# Patient Record
Sex: MALE | Race: WHITE
[De-identification: names, ages, dates, MRNs, and addresses within clinical notes are randomized per-mention and may not be internally consistent; named-entity substitution may affect disease eponyms.]

---

## 2017-03-28 ENCOUNTER — APPOINTMENT (OUTPATIENT)
Dept: PODIATRY | Facility: CLINIC | Age: 62
End: 2017-03-28

## 2017-03-28 VITALS — WEIGHT: 175 LBS | BODY MASS INDEX: 34.36 KG/M2 | HEIGHT: 60 IN

## 2017-03-28 DIAGNOSIS — L85.3 XEROSIS CUTIS: ICD-10-CM

## 2017-04-25 ENCOUNTER — OUTPATIENT (OUTPATIENT)
Dept: OUTPATIENT SERVICES | Facility: HOSPITAL | Age: 62
LOS: 1 days | Discharge: HOME | End: 2017-04-25

## 2017-04-25 ENCOUNTER — APPOINTMENT (OUTPATIENT)
Dept: PODIATRY | Facility: CLINIC | Age: 62
End: 2017-04-25

## 2017-04-25 VITALS
WEIGHT: 176 LBS | SYSTOLIC BLOOD PRESSURE: 112 MMHG | HEIGHT: 61 IN | BODY MASS INDEX: 33.23 KG/M2 | DIASTOLIC BLOOD PRESSURE: 88 MMHG | HEART RATE: 80 BPM

## 2017-04-25 LAB
ANION GAP SERPL CALC-SCNC: 5 MEQ/L
BUN SERPL-MCNC: 9 MG/DL
BUN/CREAT SERPL: 10.2 %
CALCIUM SERPL-MCNC: 9.3 MG/DL
CHLORIDE SERPL-SCNC: 104 MEQ/L
CO2 SERPL-SCNC: 29 MEQ/L
CREAT SERPL-MCNC: 0.88 MG/DL
GFR SERPL CREATININE-BSD FRML MDRD: 88
GLUCOSE SERPL-MCNC: 110 MG/DL
POTASSIUM SERPL-SCNC: 4.7 MMOL/L
SODIUM SERPL-SCNC: 138 MEQ/L

## 2017-05-19 ENCOUNTER — APPOINTMENT (OUTPATIENT)
Dept: GASTROENTEROLOGY | Facility: CLINIC | Age: 62
End: 2017-05-19

## 2017-06-26 ENCOUNTER — OUTPATIENT (OUTPATIENT)
Dept: OUTPATIENT SERVICES | Facility: HOSPITAL | Age: 62
LOS: 1 days | Discharge: HOME | End: 2017-06-26

## 2017-06-26 ENCOUNTER — OTHER (OUTPATIENT)
Age: 62
End: 2017-06-26

## 2017-06-28 DIAGNOSIS — M79.672 PAIN IN LEFT FOOT: ICD-10-CM

## 2017-06-28 DIAGNOSIS — M25.522 PAIN IN LEFT ELBOW: ICD-10-CM

## 2017-06-28 DIAGNOSIS — M72.2 PLANTAR FASCIAL FIBROMATOSIS: ICD-10-CM

## 2017-07-24 ENCOUNTER — OUTPATIENT (OUTPATIENT)
Dept: OUTPATIENT SERVICES | Facility: HOSPITAL | Age: 62
LOS: 1 days | Discharge: HOME | End: 2017-07-24

## 2017-07-25 DIAGNOSIS — H52.03 HYPERMETROPIA, BILATERAL: ICD-10-CM

## 2017-07-25 DIAGNOSIS — H52.222 REGULAR ASTIGMATISM, LEFT EYE: ICD-10-CM

## 2017-07-25 DIAGNOSIS — H52.4 PRESBYOPIA: ICD-10-CM

## 2017-07-25 DIAGNOSIS — H11.002 UNSPECIFIED PTERYGIUM OF LEFT EYE: ICD-10-CM

## 2017-07-27 ENCOUNTER — OUTPATIENT (OUTPATIENT)
Dept: OUTPATIENT SERVICES | Facility: HOSPITAL | Age: 62
LOS: 1 days | Discharge: HOME | End: 2017-07-27

## 2017-07-27 DIAGNOSIS — Z02.9 ENCOUNTER FOR ADMINISTRATIVE EXAMINATIONS, UNSPECIFIED: ICD-10-CM

## 2017-07-28 ENCOUNTER — OUTPATIENT (OUTPATIENT)
Dept: OUTPATIENT SERVICES | Facility: HOSPITAL | Age: 62
LOS: 1 days | Discharge: HOME | End: 2017-07-28

## 2017-07-28 DIAGNOSIS — M72.2 PLANTAR FASCIAL FIBROMATOSIS: ICD-10-CM

## 2017-08-02 ENCOUNTER — OUTPATIENT (OUTPATIENT)
Dept: OUTPATIENT SERVICES | Facility: HOSPITAL | Age: 62
LOS: 1 days | Discharge: HOME | End: 2017-08-02

## 2017-08-03 DIAGNOSIS — M15.0 PRIMARY GENERALIZED (OSTEO)ARTHRITIS: ICD-10-CM

## 2017-08-03 DIAGNOSIS — M77.10 LATERAL EPICONDYLITIS, UNSPECIFIED ELBOW: ICD-10-CM

## 2017-08-15 ENCOUNTER — APPOINTMENT (OUTPATIENT)
Dept: PODIATRY | Facility: CLINIC | Age: 62
End: 2017-08-15

## 2017-08-15 ENCOUNTER — OUTPATIENT (OUTPATIENT)
Dept: OUTPATIENT SERVICES | Facility: HOSPITAL | Age: 62
LOS: 1 days | Discharge: HOME | End: 2017-08-15

## 2017-08-15 VITALS — SYSTOLIC BLOOD PRESSURE: 136 MMHG | HEART RATE: 69 BPM | DIASTOLIC BLOOD PRESSURE: 65 MMHG

## 2017-08-15 VITALS — WEIGHT: 176 LBS | HEIGHT: 61 IN | BODY MASS INDEX: 33.23 KG/M2

## 2017-09-08 ENCOUNTER — APPOINTMENT (OUTPATIENT)
Dept: GASTROENTEROLOGY | Facility: CLINIC | Age: 62
End: 2017-09-08

## 2017-10-13 ENCOUNTER — OUTPATIENT (OUTPATIENT)
Dept: OUTPATIENT SERVICES | Facility: HOSPITAL | Age: 62
LOS: 1 days | Discharge: HOME | End: 2017-10-13

## 2017-10-20 ENCOUNTER — OUTPATIENT (OUTPATIENT)
Dept: OUTPATIENT SERVICES | Facility: HOSPITAL | Age: 62
LOS: 1 days | Discharge: HOME | End: 2017-10-20

## 2017-11-06 ENCOUNTER — OUTPATIENT (OUTPATIENT)
Dept: OUTPATIENT SERVICES | Facility: HOSPITAL | Age: 62
LOS: 1 days | Discharge: HOME | End: 2017-11-06

## 2017-12-06 ENCOUNTER — OUTPATIENT (OUTPATIENT)
Dept: OUTPATIENT SERVICES | Facility: HOSPITAL | Age: 62
LOS: 1 days | Discharge: HOME | End: 2017-12-06

## 2017-12-06 DIAGNOSIS — K02.52 DENTAL CARIES ON PIT AND FISSURE SURFACE PENETRATING INTO DENTIN: ICD-10-CM

## 2017-12-11 ENCOUNTER — OUTPATIENT (OUTPATIENT)
Dept: OUTPATIENT SERVICES | Facility: HOSPITAL | Age: 62
LOS: 1 days | Discharge: HOME | End: 2017-12-11

## 2017-12-15 DIAGNOSIS — K05.4 PERIODONTOSIS: ICD-10-CM

## 2017-12-26 ENCOUNTER — OUTPATIENT (OUTPATIENT)
Dept: OUTPATIENT SERVICES | Facility: HOSPITAL | Age: 62
LOS: 1 days | Discharge: HOME | End: 2017-12-26

## 2017-12-27 DIAGNOSIS — K02.53 DENTAL CARIES ON PIT AND FISSURE SURFACE PENETRATING INTO PULP: ICD-10-CM

## 2018-01-29 ENCOUNTER — OUTPATIENT (OUTPATIENT)
Dept: OUTPATIENT SERVICES | Facility: HOSPITAL | Age: 63
LOS: 1 days | Discharge: HOME | End: 2018-01-29

## 2018-04-26 ENCOUNTER — OUTPATIENT (OUTPATIENT)
Dept: OUTPATIENT SERVICES | Facility: HOSPITAL | Age: 63
LOS: 1 days | Discharge: HOME | End: 2018-04-26

## 2018-04-27 DIAGNOSIS — K02.53 DENTAL CARIES ON PIT AND FISSURE SURFACE PENETRATING INTO PULP: ICD-10-CM

## 2018-05-24 ENCOUNTER — OUTPATIENT (OUTPATIENT)
Dept: OUTPATIENT SERVICES | Facility: HOSPITAL | Age: 63
LOS: 1 days | Discharge: HOME | End: 2018-05-24

## 2018-06-12 ENCOUNTER — OUTPATIENT (OUTPATIENT)
Dept: OUTPATIENT SERVICES | Facility: HOSPITAL | Age: 63
LOS: 1 days | Discharge: HOME | End: 2018-06-12

## 2018-06-12 DIAGNOSIS — K02.53 DENTAL CARIES ON PIT AND FISSURE SURFACE PENETRATING INTO PULP: ICD-10-CM

## 2019-03-22 ENCOUNTER — APPOINTMENT (OUTPATIENT)
Dept: GASTROENTEROLOGY | Facility: CLINIC | Age: 64
End: 2019-03-22

## 2019-03-22 ENCOUNTER — OUTPATIENT (OUTPATIENT)
Dept: OUTPATIENT SERVICES | Facility: HOSPITAL | Age: 64
LOS: 1 days | Discharge: HOME | End: 2019-03-22

## 2019-03-22 NOTE — REVIEW OF SYSTEMS
[Fever] : no fever [Eye Pain] : no eye pain [Earache] : no earache [Chest Pain] : no chest pain [Shortness Of Breath] : no shortness of breath [Abdominal Pain] : no abdominal pain [Constipation] : no constipation [Diarrhea] : no diarrhea [Dysuria] : no dysuria [Confused] : no confusion [Suicidal] : not suicidal

## 2019-03-22 NOTE — PHYSICAL EXAM
[General Appearance - Alert] : alert [General Appearance - In No Acute Distress] : in no acute distress [Sclera] : the sclera and conjunctiva were normal [Outer Ear] : the ears and nose were normal in appearance [Neck Appearance] : the appearance of the neck was normal [] : no respiratory distress [Bowel Sounds] : normal bowel sounds [Abdomen Soft] : soft [No CVA Tenderness] : no ~M costovertebral angle tenderness [Abnormal Walk] : normal gait [Cranial Nerves] : cranial nerves 2-12 were intact [Deep Tendon Reflexes (DTR)] : deep tendon reflexes were 2+ and symmetric [Oriented To Time, Place, And Person] : oriented to person, place, and time

## 2019-03-22 NOTE — ASSESSMENT
[FreeTextEntry1] : 62 yo with obesity p/for screening colonoscopy. He denies any symptoms except for intermittent hard stools. \par \par 1- HCM\par screening colonoscopy \par ASA 1 \par \par 2- Obesity \par weight loss and healthy diet advised \par \par 3- hepatic steatosis on ct abd 2016\par check HBA1c, lipid profile \par weight loss and exercise recommended \par check basic labs \par

## 2019-03-22 NOTE — HISTORY OF PRESENT ILLNESS
[FreeTextEntry1] : 64 yo with obesity p/for screening colonoscopy. He denies any symptoms except for intermttent hard stools. \par \[facility] \par no [facility] \par FH negative \par has 2 bms per day, normal, no blood, nl appetite

## 2019-04-16 LAB
ALBUMIN SERPL ELPH-MCNC: 4.7 G/DL
ALP BLD-CCNC: 122 U/L
ALT SERPL-CCNC: 27 U/L
ANION GAP SERPL CALC-SCNC: 12 MMOL/L
AST SERPL-CCNC: 22 U/L
BASOPHILS # BLD AUTO: 0.03 K/UL
BASOPHILS NFR BLD AUTO: 0.4 %
BILIRUB SERPL-MCNC: 0.4 MG/DL
BUN SERPL-MCNC: 16 MG/DL
CALCIUM SERPL-MCNC: 9.2 MG/DL
CHLORIDE SERPL-SCNC: 102 MMOL/L
CO2 SERPL-SCNC: 25 MMOL/L
CREAT SERPL-MCNC: 0.9 MG/DL
EOSINOPHIL # BLD AUTO: 0.08 K/UL
EOSINOPHIL NFR BLD AUTO: 1.1 %
ESTIMATED AVERAGE GLUCOSE: 131 MG/DL
GLUCOSE SERPL-MCNC: 87 MG/DL
HBA1C MFR BLD HPLC: 6.2 %
HCT VFR BLD CALC: 45.3 %
HGB BLD-MCNC: 14.8 G/DL
IMM GRANULOCYTES NFR BLD AUTO: 0.7 %
INR PPP: 1.04 RATIO
LYMPHOCYTES # BLD AUTO: 2.22 K/UL
LYMPHOCYTES NFR BLD AUTO: 29.6 %
MAN DIFF?: NORMAL
MCHC RBC-ENTMCNC: 28.7 PG
MCHC RBC-ENTMCNC: 32.7 G/DL
MCV RBC AUTO: 87.8 FL
MONOCYTES # BLD AUTO: 0.61 K/UL
MONOCYTES NFR BLD AUTO: 8.1 %
NEUTROPHILS # BLD AUTO: 4.52 K/UL
NEUTROPHILS NFR BLD AUTO: 60.1 %
PLATELET # BLD AUTO: 236 K/UL
POTASSIUM SERPL-SCNC: 4.5 MMOL/L
PROT SERPL-MCNC: 7.6 G/DL
PT BLD: 12 SEC
RBC # BLD: 5.16 M/UL
RBC # FLD: 12.7 %
SODIUM SERPL-SCNC: 139 MMOL/L
WBC # FLD AUTO: 7.51 K/UL

## 2019-04-29 ENCOUNTER — EMERGENCY (EMERGENCY)
Facility: HOSPITAL | Age: 64
LOS: 0 days | Discharge: HOME | End: 2019-04-29
Admitting: EMERGENCY MEDICINE
Payer: MEDICAID

## 2019-04-29 VITALS
OXYGEN SATURATION: 98 % | TEMPERATURE: 98 F | SYSTOLIC BLOOD PRESSURE: 138 MMHG | DIASTOLIC BLOOD PRESSURE: 74 MMHG | RESPIRATION RATE: 18 BRPM | HEART RATE: 84 BPM | WEIGHT: 210.1 LBS

## 2019-04-29 DIAGNOSIS — M25.562 PAIN IN LEFT KNEE: ICD-10-CM

## 2019-04-29 DIAGNOSIS — Z79.899 OTHER LONG TERM (CURRENT) DRUG THERAPY: ICD-10-CM

## 2019-04-29 DIAGNOSIS — R05 COUGH: ICD-10-CM

## 2019-04-29 DIAGNOSIS — E11.9 TYPE 2 DIABETES MELLITUS WITHOUT COMPLICATIONS: ICD-10-CM

## 2019-04-29 PROCEDURE — 73562 X-RAY EXAM OF KNEE 3: CPT | Mod: 26,LT

## 2019-04-29 PROCEDURE — 99283 EMERGENCY DEPT VISIT LOW MDM: CPT

## 2019-04-29 RX ORDER — ACETAMINOPHEN 500 MG
650 TABLET ORAL ONCE
Qty: 0 | Refills: 0 | Status: COMPLETED | OUTPATIENT
Start: 2019-04-29 | End: 2019-04-29

## 2019-04-29 RX ORDER — AZITHROMYCIN 500 MG/1
1 TABLET, FILM COATED ORAL
Qty: 3 | Refills: 0
Start: 2019-04-29 | End: 2019-05-01

## 2019-04-29 RX ADMIN — Medication 650 MILLIGRAM(S): at 22:36

## 2019-04-29 NOTE — ED PROVIDER NOTE - NS ED ROS FT
Constitutional: no fever, chills, no recent weight loss, change in appetite or malaise  Cardiac: No chest pain, SOB or edema.  Respiratory: No cough or respiratory distress  GI: No nausea, vomiting, diarrhea or abdominal pain.  : No dysuria, frequency, urgency or hematuria  MS: + L knee pain. No decreased rom. No weakness  Neuro: No headache or weakness. No LOC. No decreased sensation, paresthesias  Skin: No skin rash.  Except as documented in the HPI, all other systems are negative. Constitutional: no fever, chills, no recent weight loss, change in appetite or malaise  ENT: + rhinorrhea. No sore throat/ear pain  Cardiac: No chest pain, SOB or edema.  Respiratory: + non productive cough. No respiratory distress  GI: No nausea, vomiting, diarrhea or abdominal pain.  : No dysuria, frequency, urgency or hematuria  MS: + L knee pain. No decreased rom. No weakness  Neuro: No headache or weakness. No LOC. No decreased sensation, paresthesias  Skin: No skin rash.  Except as documented in the HPI, all other systems are negative.

## 2019-04-29 NOTE — ED PROVIDER NOTE - OBJECTIVE STATEMENT
63 year old M with hx of DM c/o L anterior knee pain x 2 months. The pain is described as "soreness" and is worse with walking and better with rest. He has been taking anti inflammatories without relief. He denies any trauma/injuries, knee swelling, redness, calf pain, swelling, smoking hx, fever/chills. 63 year old M with hx of DM c/o L anterior knee pain x 2 months. The pain is described as "soreness" and is worse with walking and better with rest. He has been taking anti inflammatories without relief. Pt also c/o of rhinorrhea and non productive cough x 5 days. He denies any trauma/injuries, knee swelling, redness, calf pain, swelling, fever/chills, sore throat, smoking hx, hx of pneumonia, chest pain, sob.

## 2019-04-29 NOTE — ED PROVIDER NOTE - PHYSICAL EXAMINATION
CONSTITUTIONAL: Well-appearing; well-nourished; in no apparent distress.   CARDIOVASCULAR: Normal S1, S2; no murmurs, rubs, or gallops.   RESPIRATORY: Normal chest excursion with respiration; breath sounds clear and equal bilaterally; no wheezes, rhonchi, or rales.  GI/: Normal bowel sounds; non-distended; non-tender; no palpable organomegaly.   MS: No evidence of trauma or deformity. No swelling/effusion noted. + mild ttp over L medial patella. Full rom of b/l knee. Pedal pulses intact. No calf ttp. Normal gait. Strength equal b/l 5/5 throughout  SKIN: Normal for age and race; warm; dry; good turgor; no apparent lesions or exudate.   NEURO/PSYCH: A & O x 4; grossly unremarkable. mood and manner are appropriate. Grooming and personal hygiene are appropriate. CONSTITUTIONAL: Well-appearing; well-nourished; in no apparent distress.   ENT: non erythematous pharynx. No exudates  CARDIOVASCULAR: Normal S1, S2; no murmurs, rubs, or gallops.   RESPIRATORY: Normal chest excursion with respiration; breath sounds clear and equal bilaterally; no wheezes, rhonchi, or rales.  GI/: Normal bowel sounds; non-distended; non-tender; no palpable organomegaly.   MS: No evidence of trauma or deformity. No swelling/effusion noted. + mild ttp over L medial patella. Full rom of b/l knee. Pedal pulses intact. No calf ttp. Normal gait. Strength equal b/l 5/5 throughout  SKIN: Normal for age and race; warm; dry; good turgor; no apparent lesions or exudate.   NEURO/PSYCH: A & O x 4; grossly unremarkable. mood and manner are appropriate. Grooming and personal hygiene are appropriate.

## 2019-04-29 NOTE — ED PROVIDER NOTE - CARE PROVIDER_API CALL
Shaggy Peoples (MD)  Orthopaedic Surgery  [address] [place] [address]  Phone: [phone]  Fax: [phone]  Follow Up Time:

## 2019-05-01 ENCOUNTER — OUTPATIENT (OUTPATIENT)
Dept: OUTPATIENT SERVICES | Facility: HOSPITAL | Age: 64
LOS: 1 days | Discharge: HOME | End: 2019-05-01

## 2019-05-01 ENCOUNTER — APPOINTMENT (OUTPATIENT)
Dept: ORTHOPEDIC SURGERY | Facility: CLINIC | Age: 64
End: 2019-05-01

## 2019-05-01 PROBLEM — Z78.9 OTHER SPECIFIED HEALTH STATUS: Chronic | Status: ACTIVE | Noted: 2019-04-29

## 2019-05-07 ENCOUNTER — APPOINTMENT (OUTPATIENT)
Dept: DERMATOLOGY | Facility: CLINIC | Age: 64
End: 2019-05-07

## 2019-05-07 ENCOUNTER — OUTPATIENT (OUTPATIENT)
Dept: OUTPATIENT SERVICES | Facility: HOSPITAL | Age: 64
LOS: 1 days | Discharge: HOME | End: 2019-05-07

## 2019-05-07 NOTE — PHYSICAL EXAM
[FreeTextEntry3] : Skin tag noted on left shoulder, round in shape but about 0.5 cm in length. \par Small skin tags noted on bilateral inner thighs near the groin

## 2019-05-07 NOTE — ASSESSMENT
[FreeTextEntry1] : Patient is a 65 yo M with no significant PMHx presenting with complaints of facial moles and multiple areas of skin tags\par \par Plan:\par Facial moles benign in appearance. Can continue to observe. Skin tag on the left shoulder treated with liquid nitrogen today in office. Patient can follow up in 2 months.

## 2019-05-07 NOTE — HISTORY OF PRESENT ILLNESS
[de-identified] : Patient is a 63 yo M with no significant PMHx presenting with complaints of facial moles and multiple areas of skin tags. Patient says he has noticed the moles on his face over the last few months but initially they were raised in appearance but have become more flat. He is requesting to have his skin tags removed (if possible) for cosmetic purposes.

## 2019-05-07 NOTE — END OF VISIT
[FreeTextEntry3] : \par I performed cryo of largest tag, he will return for treatment of smaller ones [] : Resident

## 2019-06-13 ENCOUNTER — RESULT REVIEW (OUTPATIENT)
Age: 64
End: 2019-06-13

## 2019-06-13 ENCOUNTER — TRANSCRIPTION ENCOUNTER (OUTPATIENT)
Age: 64
End: 2019-06-13

## 2019-06-13 ENCOUNTER — OUTPATIENT (OUTPATIENT)
Dept: OUTPATIENT SERVICES | Facility: HOSPITAL | Age: 64
LOS: 1 days | Discharge: HOME | End: 2019-06-13
Payer: SUBSIDIZED

## 2019-06-13 VITALS
HEART RATE: 67 BPM | TEMPERATURE: 98 F | SYSTOLIC BLOOD PRESSURE: 153 MMHG | HEIGHT: 61 IN | WEIGHT: 169.98 LBS | DIASTOLIC BLOOD PRESSURE: 98 MMHG | RESPIRATION RATE: 16 BRPM

## 2019-06-13 VITALS — RESPIRATION RATE: 18 BRPM | DIASTOLIC BLOOD PRESSURE: 92 MMHG | SYSTOLIC BLOOD PRESSURE: 132 MMHG | HEART RATE: 60 BPM

## 2019-06-13 DIAGNOSIS — Z98.890 OTHER SPECIFIED POSTPROCEDURAL STATES: Chronic | ICD-10-CM

## 2019-06-13 PROCEDURE — 45380 COLONOSCOPY AND BIOPSY: CPT

## 2019-06-13 PROCEDURE — 88305 TISSUE EXAM BY PATHOLOGIST: CPT | Mod: 26

## 2019-06-13 NOTE — H&P PST ADULT - ASSESSMENT
63 yo M with PMH mentioned here for screening colonoscopy   Risks and benefits discussed with the patient.   Will proceed with the scheduled case.

## 2019-06-13 NOTE — ASU DISCHARGE PLAN (ADULT/PEDIATRIC) - CALL YOUR DOCTOR IF YOU HAVE ANY OF THE FOLLOWING:
Inability to tolerate liquids or foods/Pain not relieved by Medications/Bleeding that does not stop/Nausea and vomiting that does not stop

## 2019-06-13 NOTE — PRE-ANESTHESIA EVALUATION PEDIATRIC - NSATTENDATTESTRD_GEN_ALL_CORE
(3) assistive equipment and person
The patient has been re-examined and I agree with the above assessment or I updated with my findings.

## 2019-06-13 NOTE — CHART NOTE - NSCHARTNOTEFT_GEN_A_CORE
PACU ANESTHESIA ADMISSION NOTE      Procedure: colonoscopy  Post op diagnosis:  screening, colon polyps    ____  Intubated  TV:______       Rate: ______      FiO2: ______    _x___  Patent Airway    _x___  Full return of protective reflexes    _x___  Full recovery from anesthesia / back to baseline     Vitals:   T:  37C         R:      12            BP:     109/58             Sat:    97               P: 66      Mental Status:  __x__ Awake   __x___ Alert   _____ Drowsy   _____ Sedated    Nausea/Vomiting:  _x___ NO  ______Yes,   See Post - Op Orders          Pain Scale (0-10):  __0/10___    Treatment: ____ None    __x__ See Post - Op/PCA Orders    Post - Operative Fluids:   ____ Oral   _x___ See Post - Op Orders    Plan: Discharge:   __x__Home       _____Floor     _____Critical Care    _____  Other:_________________    Comments: pt tolerated procedure well, no anesthesia related complications. Care of pt endorsed to PACU, report given to PACU RN.

## 2019-06-18 LAB — SURGICAL PATHOLOGY STUDY: SIGNIFICANT CHANGE UP

## 2019-06-21 DIAGNOSIS — K64.8 OTHER HEMORRHOIDS: ICD-10-CM

## 2019-06-21 DIAGNOSIS — D12.0 BENIGN NEOPLASM OF CECUM: ICD-10-CM

## 2019-06-21 DIAGNOSIS — K57.30 DIVERTICULOSIS OF LARGE INTESTINE WITHOUT PERFORATION OR ABSCESS WITHOUT BLEEDING: ICD-10-CM

## 2019-06-21 DIAGNOSIS — K52.9 NONINFECTIVE GASTROENTERITIS AND COLITIS, UNSPECIFIED: ICD-10-CM

## 2019-06-21 DIAGNOSIS — D12.4 BENIGN NEOPLASM OF DESCENDING COLON: ICD-10-CM

## 2019-06-21 DIAGNOSIS — Z12.11 ENCOUNTER FOR SCREENING FOR MALIGNANT NEOPLASM OF COLON: ICD-10-CM

## 2019-06-28 ENCOUNTER — LABORATORY RESULT (OUTPATIENT)
Age: 64
End: 2019-06-28

## 2019-06-28 ENCOUNTER — OUTPATIENT (OUTPATIENT)
Dept: OUTPATIENT SERVICES | Facility: HOSPITAL | Age: 64
LOS: 1 days | Discharge: HOME | End: 2019-06-28

## 2019-06-28 ENCOUNTER — APPOINTMENT (OUTPATIENT)
Dept: GASTROENTEROLOGY | Facility: CLINIC | Age: 64
End: 2019-06-28
Payer: SUBSIDIZED

## 2019-06-28 DIAGNOSIS — Z98.890 OTHER SPECIFIED POSTPROCEDURAL STATES: Chronic | ICD-10-CM

## 2019-06-28 PROCEDURE — 99214 OFFICE O/P EST MOD 30 MIN: CPT

## 2019-06-28 NOTE — REASON FOR VISIT
[Follow-Up: _____] : a [unfilled] follow-up visit [Initial Evaluation] : an initial evaluation [FreeTextEntry1] : colonoscopy for screening

## 2019-06-28 NOTE — ASSESSMENT
[FreeTextEntry1] : 62 yo with obesity p/for screening colonoscopy. He denies any symptoms except for intermittent hard stools. \par \par 1- colon polyp\par colonoscopy 6/13/19 dr colvin good prep, 3 mm cecal polyp (mucosa), 5 mm DC TA, mild sigmoid diverticulosis, internal hemorrhoids, SC erythema (mild inflammation on bx) \par colonoscopy in 5 years \par \par 2- Obesity \par weight loss and healthy diet advised \par \par 3- hepatic steatosis on ct abd 2016\par HBA1c 6.2\par check lipid profile \par weight loss and exercise recommended \par  \par 4- Born in [birth year]\par check HCV ab

## 2019-06-28 NOTE — HISTORY OF PRESENT ILLNESS
[FreeTextEntry1] : 62 yo with obesity p/for fu for results of screening colonoscopy. He denies any symptoms except for intermittent hard stools. \par \[facility] \par colonoscopy 6/13/19 dr colvin good prep, 3 mm cecal polyp (mucosa), 5 mm DC TA, mild sigmoid diverticulosis, internal hemorrhoids, SC erythema (mild inflammation on bx) \par FH negative \par has 2 bms per day, normal, no blood, nl appetite

## 2019-06-28 NOTE — PHYSICAL EXAM
[General Appearance - Alert] : alert [Sclera] : the sclera and conjunctiva were normal [General Appearance - In No Acute Distress] : in no acute distress [Neck Appearance] : the appearance of the neck was normal [Outer Ear] : the ears and nose were normal in appearance [Bowel Sounds] : normal bowel sounds [Abdomen Soft] : soft [] : no respiratory distress [No CVA Tenderness] : no ~M costovertebral angle tenderness [Cranial Nerves] : cranial nerves 2-12 were intact [Abnormal Walk] : normal gait [Deep Tendon Reflexes (DTR)] : deep tendon reflexes were 2+ and symmetric [Oriented To Time, Place, And Person] : oriented to person, place, and time [Heart Sounds] : normal S1 and S2 [Heart Rate And Rhythm] : heart rate was normal and rhythm regular [Affect] : the affect was normal

## 2019-06-28 NOTE — REVIEW OF SYSTEMS
[Eye Pain] : no eye pain [Earache] : no earache [Fever] : no fever [Abdominal Pain] : no abdominal pain [Chest Pain] : no chest pain [Shortness Of Breath] : no shortness of breath [Diarrhea] : no diarrhea [Constipation] : no constipation [Dysuria] : no dysuria [Suicidal] : not suicidal [Confused] : no confusion

## 2019-07-01 LAB
HCV AB SER QL: NONREACTIVE
HCV S/CO RATIO: 0.04 S/CO

## 2019-07-09 ENCOUNTER — APPOINTMENT (OUTPATIENT)
Dept: DERMATOLOGY | Facility: CLINIC | Age: 64
End: 2019-07-09
Payer: SELF-PAY

## 2019-07-09 ENCOUNTER — OUTPATIENT (OUTPATIENT)
Dept: OUTPATIENT SERVICES | Facility: HOSPITAL | Age: 64
LOS: 1 days | Discharge: HOME | End: 2019-07-09

## 2019-07-09 DIAGNOSIS — Z98.890 OTHER SPECIFIED POSTPROCEDURAL STATES: Chronic | ICD-10-CM

## 2019-07-09 DIAGNOSIS — L91.8 OTHER HYPERTROPHIC DISORDERS OF THE SKIN: ICD-10-CM

## 2019-07-09 PROCEDURE — 11200 RMVL SKIN TAGS UP TO&INC 15: CPT

## 2019-07-09 PROCEDURE — 11201 RMVL SKIN TAGS EA ADDL 10: CPT

## 2019-07-10 ENCOUNTER — OUTPATIENT (OUTPATIENT)
Dept: OUTPATIENT SERVICES | Facility: HOSPITAL | Age: 64
LOS: 1 days | Discharge: HOME | End: 2019-07-10

## 2019-07-10 DIAGNOSIS — Z98.890 OTHER SPECIFIED POSTPROCEDURAL STATES: Chronic | ICD-10-CM

## 2019-07-18 DIAGNOSIS — M15.0 PRIMARY GENERALIZED (OSTEO)ARTHRITIS: ICD-10-CM

## 2019-08-13 ENCOUNTER — APPOINTMENT (OUTPATIENT)
Dept: PLASTIC SURGERY | Facility: CLINIC | Age: 64
End: 2019-08-13
Payer: SUBSIDIZED

## 2019-08-13 VITALS — HEIGHT: 61 IN | BODY MASS INDEX: 33.23 KG/M2 | WEIGHT: 176 LBS

## 2019-08-13 PROCEDURE — 99202 OFFICE O/P NEW SF 15 MIN: CPT

## 2019-08-13 NOTE — PHYSICAL EXAM
[de-identified] : well-developed male, NAD [de-identified] : NC/AT [de-identified] : PERRL [de-identified] : multiple bilateral neck skin tags, nontender, nonirritated, nonpigmented  [de-identified] : good inspiratory effort [de-identified] : ANITAR [de-identified] : soft, nontender

## 2019-08-13 NOTE — ASSESSMENT
[FreeTextEntry1] : 65 yo healthy M with bilateral neck benign appearing skin lesion. \par \par - office excision \par \par as above\par muiltiple skin tage around neck and B/L axillae\par \par office procedure w Breanna and f/u w PA's thereafter\par \par Regarding the procedure, we discussed scarring, poor wound healing, bleeding, infection, need for additional surgery, and dissatisfaction with the outcome.  Also discussed possibility of keloid and/or hypertrophic scar formation as well as recurrence.  All questions were answered and risks understood.\par \par  phone used

## 2019-08-13 NOTE — REASON FOR VISIT
[Initial Evaluation] : an initial evaluation [[employer] ] : provided by [employer]   [FreeTextEntry1] : [id number] [FreeTextEntry2] : Rajesh

## 2019-08-13 NOTE — HISTORY OF PRESENT ILLNESS
[FreeTextEntry1] : 63 yo otherwise healthy M who presents today for evaluation of neck skin lesion for several months.

## 2019-08-30 ENCOUNTER — OUTPATIENT (OUTPATIENT)
Dept: OUTPATIENT SERVICES | Facility: HOSPITAL | Age: 64
LOS: 1 days | Discharge: HOME | End: 2019-08-30

## 2019-08-30 ENCOUNTER — APPOINTMENT (OUTPATIENT)
Dept: GASTROENTEROLOGY | Facility: CLINIC | Age: 64
End: 2019-08-30
Payer: COMMERCIAL

## 2019-08-30 VITALS
WEIGHT: 171 LBS | DIASTOLIC BLOOD PRESSURE: 80 MMHG | HEIGHT: 61 IN | HEART RATE: 67 BPM | BODY MASS INDEX: 32.28 KG/M2 | SYSTOLIC BLOOD PRESSURE: 144 MMHG

## 2019-08-30 DIAGNOSIS — Z98.890 OTHER SPECIFIED POSTPROCEDURAL STATES: Chronic | ICD-10-CM

## 2019-08-30 PROCEDURE — 99213 OFFICE O/P EST LOW 20 MIN: CPT | Mod: GE

## 2019-08-30 RX ORDER — POLYETHYLENE GLYCOL 3350 AND ELECTROLYTES WITH LEMON FLAVOR 236; 22.74; 6.74; 5.86; 2.97 G/4L; G/4L; G/4L; G/4L; G/4L
236 POWDER, FOR SOLUTION ORAL
Qty: 1 | Refills: 0 | Status: COMPLETED | COMMUNITY
Start: 2019-03-22 | End: 2019-08-30

## 2019-08-30 NOTE — END OF VISIT
[FreeTextEntry3] : Pt seen with Dr Freitas.  We will give patient metamucil for constipation.  Repeat colonoscopy recommended in five years.  Follow up with us in one month.

## 2019-08-30 NOTE — ASSESSMENT
[FreeTextEntry1] : 64 yo with obesity p/for screening colonoscopy. He denies any symptoms except for intermittent hard stools. He still c/o intermittent hard stools\par \par 1- colon polyp\par colonoscopy 6/13/19 dr colvin good prep, 3 mm cecal polyp (mucosa), 5 mm DC TA, mild sigmoid diverticulosis, internal hemorrhoids, SC erythema (mild inflammation on bx) \par colonoscopy in 5 years \par \par 2- Obesity \par weight loss and healthy diet advised \par \par 3- hepatic steatosis on ct abd 2016\par HBA1c 6.2 then 6.9\par lipid profile noted \par weight loss and exercise recommended \par  \par 4- Born in [birth year]\par HCV ab negative \par \par 5- Chronic hard stools\par start metamucyl

## 2019-08-30 NOTE — HISTORY OF PRESENT ILLNESS
[FreeTextEntry1] : 62 yo with obesity p/for fu for results of screening colonoscopy. He denies any symptoms except for intermittent hard stools. He still c/o intermittent hard stools. \par \par egd [facility] yrs ago in [facility] gastritis \par colonoscopy 6/13/19 dr colvin good prep, 3 mm cecal polyp (mucosa), 5 mm DC TA, mild sigmoid diverticulosis, internal hemorrhoids, SC erythema (mild inflammation on bx) \par FH negative \par has 2 bms per day, normal, no blood, nl appetite

## 2019-08-30 NOTE — PHYSICAL EXAM
[General Appearance - Alert] : alert [General Appearance - In No Acute Distress] : in no acute distress [Sclera] : the sclera and conjunctiva were normal [Outer Ear] : the ears and nose were normal in appearance [Neck Appearance] : the appearance of the neck was normal [] : no respiratory distress [Heart Rate And Rhythm] : heart rate was normal and rhythm regular [Heart Sounds] : normal S1 and S2 [Bowel Sounds] : normal bowel sounds [Abdomen Soft] : soft [No CVA Tenderness] : no ~M costovertebral angle tenderness [Abnormal Walk] : normal gait [Cranial Nerves] : cranial nerves 2-12 were intact [Deep Tendon Reflexes (DTR)] : deep tendon reflexes were 2+ and symmetric [Oriented To Time, Place, And Person] : oriented to person, place, and time [Affect] : the affect was normal

## 2019-09-04 ENCOUNTER — OUTPATIENT (OUTPATIENT)
Dept: OUTPATIENT SERVICES | Facility: HOSPITAL | Age: 64
LOS: 1 days | Discharge: HOME | End: 2019-09-04

## 2019-09-04 ENCOUNTER — LABORATORY RESULT (OUTPATIENT)
Age: 64
End: 2019-09-04

## 2019-09-04 ENCOUNTER — APPOINTMENT (OUTPATIENT)
Dept: PLASTIC SURGERY | Facility: CLINIC | Age: 64
End: 2019-09-04
Payer: SUBSIDIZED

## 2019-09-04 DIAGNOSIS — Z98.890 OTHER SPECIFIED POSTPROCEDURAL STATES: Chronic | ICD-10-CM

## 2019-09-04 DIAGNOSIS — M79.644 PAIN IN RIGHT FINGER(S): ICD-10-CM

## 2019-09-04 DIAGNOSIS — D48.7 NEOPLASM OF UNCERTAIN BEHAVIOR OF OTHER SPECIFIED SITES: ICD-10-CM

## 2019-09-04 PROCEDURE — 99212 OFFICE O/P EST SF 10 MIN: CPT | Mod: 25

## 2019-09-04 PROCEDURE — 11200 RMVL SKIN TAGS UP TO&INC 15: CPT

## 2019-09-04 NOTE — REASON FOR VISIT
[Procedure: _________] : a [unfilled] procedure visit [ Service] : provided by  Service [FreeTextEntry1] : [id number] [FreeTextEntry2] : Raimundo [TWNoteComboBox1] : Turkish

## 2019-09-04 NOTE — ASSESSMENT
[FreeTextEntry1] : 65 yo M with multiple bl axillary and neck skin lesions c/w skin tags s/p excision today. Patient also c/o right thumb pain and additional groin skin tags. \par \par - f/u 2-3 weeks, may do shave of remaining groin skin tags\par - hand X-ray

## 2019-09-04 NOTE — PROCEDURE
[___ ml Inj] : Anesthesia: [unfilled] ~Uml [1%] : 1% [With Epi] : with epinephrine [Betadine] : using betadine [FreeTextEntry1] : Bilateral axillary and neck skin tags [FreeTextEntry2] : Excision [FreeTextEntry3] : local  [FreeTextEntry5] : none [FreeTextEntry4] : minimal [FreeTextEntry6] : Bilateral neck and axillary area was prepped and draped in the usual surgical fashion. After administration of local anesthetic and confirmation of local anesthesia all skin lesions were sharply excised. Hemostasis was achieved with Monsel's solution. Bacitracin and clean sterile dressing was applied. \par \par Patient tolerated the procedure and did not have any complications.

## 2019-09-05 DIAGNOSIS — D48.7 NEOPLASM OF UNCERTAIN BEHAVIOR OF OTHER SPECIFIED SITES: ICD-10-CM

## 2019-09-15 ENCOUNTER — FORM ENCOUNTER (OUTPATIENT)
Age: 64
End: 2019-09-15

## 2019-09-16 ENCOUNTER — OUTPATIENT (OUTPATIENT)
Dept: OUTPATIENT SERVICES | Facility: HOSPITAL | Age: 64
LOS: 1 days | Discharge: HOME | End: 2019-09-16
Payer: SUBSIDIZED

## 2019-09-16 DIAGNOSIS — Z98.890 OTHER SPECIFIED POSTPROCEDURAL STATES: Chronic | ICD-10-CM

## 2019-09-16 DIAGNOSIS — M79.644 PAIN IN RIGHT FINGER(S): ICD-10-CM

## 2019-09-16 PROCEDURE — 73130 X-RAY EXAM OF HAND: CPT | Mod: 26,RT

## 2019-09-25 ENCOUNTER — LABORATORY RESULT (OUTPATIENT)
Age: 64
End: 2019-09-25

## 2019-09-25 ENCOUNTER — OUTPATIENT (OUTPATIENT)
Dept: OUTPATIENT SERVICES | Facility: HOSPITAL | Age: 64
LOS: 1 days | Discharge: HOME | End: 2019-09-25

## 2019-09-25 ENCOUNTER — APPOINTMENT (OUTPATIENT)
Dept: PLASTIC SURGERY | Facility: CLINIC | Age: 64
End: 2019-09-25
Payer: COMMERCIAL

## 2019-09-25 DIAGNOSIS — Z98.890 OTHER SPECIFIED POSTPROCEDURAL STATES: Chronic | ICD-10-CM

## 2019-09-25 DIAGNOSIS — M79.641 PAIN IN RIGHT HAND: ICD-10-CM

## 2019-09-25 PROCEDURE — 99212 OFFICE O/P EST SF 10 MIN: CPT | Mod: 25

## 2019-09-25 PROCEDURE — 11200 RMVL SKIN TAGS UP TO&INC 15: CPT

## 2019-09-26 DIAGNOSIS — D48.5 NEOPLASM OF UNCERTAIN BEHAVIOR OF SKIN: ICD-10-CM

## 2019-09-26 NOTE — DATA REVIEWED
[FreeTextEntry1] : \par  Biopsy             Final\par \par No Documents Attached\par \par \par \par \par   KHADRA NAM                          1\par \par \par \par                  Surgical Final Report\par \par \par \par \par           Final Diagnosis\par           1. Neck skin lesion, excision:\par           -  Fragments of seborrheic keratosis.\par \par           2. Skin tag, axilla, excision:\par           - Fragments of seborrheic keratosis.\par \par           Verified by: Fredo Chapman M.D.\par           (Electronic Signature)\par           Reported on: 09/06/19 16:38 EDT, [address] [place] [address],[facility] [id number]\par           _________________________________________________________________\par \par           Clinical History\par           Excision of bilateral neck and axillary skin lesion consistent\par           with skin tags\par \par           Specimen(s) Submitted\par           1     Neck skin lesion\par           2     Axillary skin lesion\par \par           Gross Description\par           1. The specimen is received in formalin, labeled "neck skin\par           lesion" and consists of multiple fragments of brown tan skin tags\par           measuring 0.1 x 0.1 x 0.1 cm to 0.4 x 0.1 x 0.1 cm. The specimen\par           is submitted entirely. (1 block)\par \par           2. The specimen is received in formalin, labeled "axillary skin\par           tags" and consists of multiple fragments of gray brown tan skin\par           tags measuring 0.2 x 0.1 x 0.1 cm to 0.5 x 0.3 x 0.3 cm. The base\par           of two large fragments are inked black and bisected. The\par           remaining multiple skin tags are also entirely submitted. (2\par           blocks)\par \par           Specimen was received and underwent gross examination at [facility], [address] [place] [address],\[facility] [address].\par \par           09/05/19 11:54 ns\par \par  \par \par  Ordered by: KENDALL OLIVARES IV       Collected/Examined: 04Sep2019 02:19PM       \par Verification Required       Stage: Final       \par  Performed at: [facility] (Med Director: Don Owen M.D)       Resulted: 06Sep2019 04:38PM       Last Updated: 06Sep2019 04:38PM       Accession: [id number]       \par \par \par \par \par \par INTERPRETATION: Clinical History:Right finger pain. Would might have been \par into right thumb 3 weeks ago. \par \par Technique: 4 views of the right hand. \par \par No studies are available for direct comparison. \par \par Findings: \par \par No evidence of acute fracture or dislocation. \par \par No evidence of radiopaque foreign body. \par \par There are degenerative changes of the distal and proximal interphalangeal \par joints, and the first interphalangeal joint, worst at the third and fifth \par DIP. \par \par Impression: \par \par No acute fracture or radiopaque foreign body. \par \par \par \par \par \par BRAD GUPTA M.D., RESIDENT RADIOLOGIST

## 2019-09-26 NOTE — HISTORY OF PRESENT ILLNESS
[FreeTextEntry1] : 63 yo otherwise healthy M with bilateral neck, axillary and groin skin lesions who presents today 2 weeks s/p excision of neck and axillary lesions. Doing well and reporting no significant pain, fever, chills or bleeding. \par Patient is also c/o right thumb and 3rd digit pain for few weeks. Denies any hand trauma, tingling, paresthesia, numbness, limited ROM or finger locking. \par

## 2019-09-26 NOTE — PROCEDURE
[1%] : 1% [___ ml Inj] : Anesthesia: [unfilled] ~Uml [With Epi] : with epinephrine [Betadine] : using betadine [FreeTextEntry1] : Bilateral axillary and groin skin tags [FreeTextEntry2] : Excision [FreeTextEntry3] : local  [FreeTextEntry4] : minimal [FreeTextEntry5] : none [FreeTextEntry6] : Bilateral axillary and groin areas were prepped and draped in the usual surgical fashion. After administration of local anesthetic and confirmation of local anesthesia all skin lesions were sharply excised. Hemostasis was achieved with Monsel's solution. Bacitracin and clean sterile dressing was applied. \par \par Patient tolerated the procedure and did not have any complications.  [FreeTextEntry8] : bilateral axillary and bilateral groins

## 2019-09-26 NOTE — ASSESSMENT
[FreeTextEntry1] : 63 yo M with multiple bl axillary, neck and groin skin lesions s/p excision of neck/axillary lesions 2 weeks ago with pathology revealing seborrheic keratosis.\par s/p excision of additional axillary and groin lesions today. Doing well. \par \par - daily Aquaphor to excision sites\par - pathology discussed\par - hand X-ray reviewed, arthritic changes\par - f/u 2 weeks for wound check

## 2019-10-09 ENCOUNTER — APPOINTMENT (OUTPATIENT)
Dept: PLASTIC SURGERY | Facility: CLINIC | Age: 64
End: 2019-10-09
Payer: COMMERCIAL

## 2019-10-09 DIAGNOSIS — D48.5 NEOPLASM OF UNCERTAIN BEHAVIOR OF SKIN: ICD-10-CM

## 2019-10-09 DIAGNOSIS — L82.1 OTHER SEBORRHEIC KERATOSIS: ICD-10-CM

## 2019-10-09 PROCEDURE — 99212 OFFICE O/P EST SF 10 MIN: CPT

## 2019-10-09 NOTE — HISTORY OF PRESENT ILLNESS
[FreeTextEntry1] : 65 yo otherwise healthy M with bilateral neck, axillary and groin skin lesions who presents today 2 weeks s/p excision of neck and axillary lesions. Doing well and reporting no significant pain, fever, chills or bleeding. \par Patient is also c/o right thumb and 3rd digit pain for few weeks. Denies any hand trauma, tingling, paresthesia, numbness, limited ROM or finger locking. \par \par Interval hx (10/9/19). Patient presents today 2 weeks s/p excision of bilateral axillary and groin skin lesions. Doing well and reporting no pain, fever, chills or bleeding. \par

## 2019-10-09 NOTE — DATA REVIEWED
[FreeTextEntry1] :  Biopsy             Final\par \par No Documents Attached\par \par \par   NAM, KHADRA                          2\par \par \par \par                  Surgical Final Report\par \par \par \par \par           Final Diagnosis\par           1. Skin, left axilla, shave biopsy:\par           - Skin tag with seborrheic features.\par \par           2. Skin, right axilla, shave biopsy:\par           - Fragments of skin tag/acrochordon.\par \par           3. Left groin skin lesion, shave biopsy:\par           - Skin tag/acrochordon.\par \par           4. Right groin skin lesion, shave biopsy:\par           - Skin tag/acrochordon.\par \par           Verified by: Fredo Chapman M.D.\par           (Electronic Signature)\par           Reported on: 09/27/19 16:07 EDT, [facility],\par           [place] [address]\par           Phone: [phone]   Fax: [phone]\par           _________________________________________________________________\par \par           Clinical History\par           Shave bx of left and right axilla and left and right groin skin\par           lesions. R/O seborrheic keratosis\par \par           Specimen(s) Submitted\par           1     Left axilla\par           2     Right axilla\par           3     Left groin skin lesion\par           4     Right groin skin lesion\par \par           Gross Description\par           1. The specimen is received in formalin, labeled "left axilla,\par           shave biopsy" and consists of a single fragment of brown tan skin\par           measuring 0.3 x0 .2 x 0.1 cm. The specimen is submitted entirely.\par           (1 block)\par \par           2. The specimen is received in formalin, labeled "right axilla"\par           and consists of five fragments of brown tan skin measuring 0.2 x\par           0.1 x 0.1 cm to 0.3 x 0.2 x 0.1 cm. The specimen is submitted\par           entirely. (1 block)\par \par           3. The specimen is received in formalin, labeled "left groin" and\par           consists of four fragments of brown tan skin measuring 0.1 x0 .1\par           x 0.1 cm to 0.3 x 0.2 x 0.1 cm. The specimen is submitted\par           entirely. (1 block)\par \par           4. The specimen is received in formalin, labeled "right groin"\par           and consists of three fragments of brown tan skin\par \par \par \par \par \par           NAM, KHADRA                          2\par \par \par \par                  Surgical Final Report\par \par \par \par \par           measuring 0.1 x 0.1 x 0.1 cm to 0.5 x 0.1 x 0.1 cm. The specimen\par           is submitted entirely. (1 block)\par \par           Specimen was received and underwent gross examination at [facility], [address] [place] [address],\[place] [address].\par \par           09/26/19 12:58 ns\par \par  \par \par  Ordered by: KENDALL OLIVARES IV       Collected/Examined: 25Sep2019 02:32PM       \par Verification Required       Stage: Final       \par  Performed at: [facility] (Med Director: Don Owen M.D)       Resulted: 27Sep2019 04:07PM       Last Updated: 27Sep2019 04:07PM       Accession: [id number]

## 2019-10-09 NOTE — ASSESSMENT
[FreeTextEntry1] : 63 yo M with multiple bl axillary, neck and groin skin lesions s/p excision of neck/axillary lesions 4 weeks ago with pathology revealing seborrheic keratosis.\par 2 weeks s/p excision of additional axillary and groin lesions today. Doing well. Pathology reveals skin tags. \par \par - daily Aquaphor to excision sites\par - pathology discussed\par - f/u PRN

## 2019-10-09 NOTE — PHYSICAL EXAM
[de-identified] : well developed  male, NAD [de-identified] : right hand with no evidence of trauma, arthritic PIP and DIP joints especially 3rd and 5th digit, FROM with intact sensory exam, negative Tinel/Phalen test [de-identified] : bilateral neck excision sites all healed, clean, no evidence of infection  [de-identified] : bilateral axilla and groins with well healed excision sites, clean, no evidence of infection

## 2019-11-12 ENCOUNTER — APPOINTMENT (OUTPATIENT)
Dept: DERMATOLOGY | Facility: CLINIC | Age: 64
End: 2019-11-12

## 2020-01-17 ENCOUNTER — OUTPATIENT (OUTPATIENT)
Dept: OUTPATIENT SERVICES | Facility: HOSPITAL | Age: 65
LOS: 1 days | Discharge: HOME | End: 2020-01-17

## 2020-01-17 ENCOUNTER — APPOINTMENT (OUTPATIENT)
Dept: GASTROENTEROLOGY | Facility: CLINIC | Age: 65
End: 2020-01-17
Payer: COMMERCIAL

## 2020-01-17 VITALS
HEIGHT: 61 IN | HEART RATE: 66 BPM | BODY MASS INDEX: 32.85 KG/M2 | SYSTOLIC BLOOD PRESSURE: 138 MMHG | WEIGHT: 174 LBS | DIASTOLIC BLOOD PRESSURE: 87 MMHG

## 2020-01-17 DIAGNOSIS — Z98.890 OTHER SPECIFIED POSTPROCEDURAL STATES: Chronic | ICD-10-CM

## 2020-01-17 PROCEDURE — 99213 OFFICE O/P EST LOW 20 MIN: CPT | Mod: GC

## 2020-01-17 NOTE — PHYSICAL EXAM
[General Appearance - Alert] : alert [General Appearance - In No Acute Distress] : in no acute distress [Sclera] : the sclera and conjunctiva were normal [Outer Ear] : the ears and nose were normal in appearance [Neck Appearance] : the appearance of the neck was normal [] : no respiratory distress [Heart Rate And Rhythm] : heart rate was normal and rhythm regular [Heart Sounds] : normal S1 and S2 [Bowel Sounds] : normal bowel sounds [Abdomen Soft] : soft [No CVA Tenderness] : no ~M costovertebral angle tenderness [Cranial Nerves] : cranial nerves 2-12 were intact [Abnormal Walk] : normal gait [Oriented To Time, Place, And Person] : oriented to person, place, and time [Deep Tendon Reflexes (DTR)] : deep tendon reflexes were 2+ and symmetric [Affect] : the affect was normal

## 2020-01-17 NOTE — HISTORY OF PRESENT ILLNESS
[FreeTextEntry1] : 64 yo with obesity p/for fu for results of screening colonoscopy. He denies any symptoms except for intermittent hard stools. He still c/o intermittent hard stools. \par \par egd 20 yrs ago in [facility] gastritis \par colonoscopy 6/13/19 dr colvin good prep, 3 mm cecal polyp (mucosa), 5 mm DC TA, mild sigmoid diverticulosis, internal hemorrhoids, SC erythema (mild inflammation on bx) \par FH negative \par has 2 bms per day, normal, no blood, nl appetite

## 2020-01-17 NOTE — ASSESSMENT
[FreeTextEntry1] : 62 yo with obesity p/for screening colonoscopy. He denies any symptoms except for intermittent hard stools. He still c/o intermittent hard stools\par \par 1- colon polyp\par colonoscopy 6/13/19 dr colvin good prep, 3 mm cecal polyp (mucosa), 5 mm DC TA, mild sigmoid diverticulosis, internal hemorrhoids, SC erythema (mild inflammation on bx) \par colonoscopy in 5 years \par \par 2- Obesity, hep steatosis on ct abd 2017 \par weight loss and healthy diet advised \par dietician referral \par checl LFTs yearly \par \par 3- hepatic steatosis on ct abd 2016\par HBA1c 6.2 then 6.9\par lipid profile noted \par weight loss and exercise recommended \par dietician referral \par  \par 4- Born [id number]\par HCV ab negative \par \par 5- Chronic hard stools\par eating more fibers \par will start metamucyl

## 2020-01-17 NOTE — REVIEW OF SYSTEMS
[Eye Pain] : no eye pain [Fever] : no fever [Earache] : no earache [Abdominal Pain] : no abdominal pain [Chest Pain] : no chest pain [Shortness Of Breath] : no shortness of breath [Diarrhea] : no diarrhea [Constipation] : no constipation [Dysuria] : no dysuria [Suicidal] : not suicidal [Confused] : no confusion

## 2020-01-17 NOTE — END OF VISIT
[FreeTextEntry2] : hx polyp\par next colonoscopy 5 years\par \par steatosis\par diet and exercise [Time Spent: ___ minutes] : I have spent [unfilled] minutes of face to face time with the patient [] : Fellow

## 2020-01-21 LAB
ALBUMIN SERPL ELPH-MCNC: 4.8 G/DL
ALP BLD-CCNC: 100 U/L
ALT SERPL-CCNC: 21 U/L
AST SERPL-CCNC: 22 U/L
BILIRUB DIRECT SERPL-MCNC: <0.2 MG/DL
BILIRUB INDIRECT SERPL-MCNC: >0.3 MG/DL
BILIRUB SERPL-MCNC: 0.5 MG/DL
PROT SERPL-MCNC: 8 G/DL

## 2020-01-24 ENCOUNTER — APPOINTMENT (OUTPATIENT)
Dept: NUTRITION | Facility: CLINIC | Age: 65
End: 2020-01-24

## 2020-01-28 ENCOUNTER — OUTPATIENT (OUTPATIENT)
Dept: OUTPATIENT SERVICES | Facility: HOSPITAL | Age: 65
LOS: 1 days | Discharge: HOME | End: 2020-01-28

## 2020-01-28 DIAGNOSIS — Z98.890 OTHER SPECIFIED POSTPROCEDURAL STATES: Chronic | ICD-10-CM

## 2020-03-12 ENCOUNTER — OUTPATIENT (OUTPATIENT)
Dept: OUTPATIENT SERVICES | Facility: HOSPITAL | Age: 65
LOS: 1 days | Discharge: HOME | End: 2020-03-12

## 2020-03-12 DIAGNOSIS — Z98.890 OTHER SPECIFIED POSTPROCEDURAL STATES: Chronic | ICD-10-CM

## 2020-06-19 ENCOUNTER — APPOINTMENT (OUTPATIENT)
Dept: GASTROENTEROLOGY | Facility: CLINIC | Age: 65
End: 2020-06-19

## 2020-11-30 ENCOUNTER — OUTPATIENT (OUTPATIENT)
Dept: OUTPATIENT SERVICES | Facility: HOSPITAL | Age: 65
LOS: 1 days | Discharge: HOME | End: 2020-11-30

## 2020-11-30 ENCOUNTER — APPOINTMENT (OUTPATIENT)
Dept: INTERNAL MEDICINE | Facility: CLINIC | Age: 65
End: 2020-11-30
Payer: COMMERCIAL

## 2020-11-30 VITALS
OXYGEN SATURATION: 96 % | DIASTOLIC BLOOD PRESSURE: 80 MMHG | TEMPERATURE: 98.2 F | SYSTOLIC BLOOD PRESSURE: 142 MMHG | HEIGHT: 61 IN | HEART RATE: 79 BPM | BODY MASS INDEX: 33.23 KG/M2 | WEIGHT: 176 LBS

## 2020-11-30 DIAGNOSIS — Z98.890 OTHER SPECIFIED POSTPROCEDURAL STATES: Chronic | ICD-10-CM

## 2020-11-30 PROCEDURE — 99213 OFFICE O/P EST LOW 20 MIN: CPT | Mod: GC

## 2020-11-30 RX ORDER — PSYLLIUM SEED
48.57 PACKET (EA) ORAL DAILY
Qty: 1 | Refills: 5 | Status: DISCONTINUED | COMMUNITY
Start: 2020-01-17 | End: 2020-11-30

## 2020-11-30 RX ORDER — PSYLLIUM SEED
48.57 PACKET (EA) ORAL DAILY
Qty: 1 | Refills: 5 | Status: DISCONTINUED | COMMUNITY
Start: 2019-08-30 | End: 2020-11-30

## 2020-11-30 RX ORDER — UREA 1 ML/10ML
10 LOTION TOPICAL TWICE DAILY
Qty: 1 | Refills: 1 | Status: DISCONTINUED | COMMUNITY
Start: 2017-03-28 | End: 2020-11-30

## 2020-11-30 RX ORDER — AMMONIUM LACTATE 12 %
12 CREAM (GRAM) TOPICAL TWICE DAILY
Qty: 1 | Refills: 3 | Status: DISCONTINUED | COMMUNITY
Start: 2017-04-25 | End: 2020-11-30

## 2020-11-30 NOTE — REVIEW OF SYSTEMS
[Itching] : itching [Fever] : no fever [Chills] : no chills [Fatigue] : no fatigue [Discharge] : no discharge [Pain] : no pain [Earache] : no earache [Hearing Loss] : no hearing loss [Chest Pain] : no chest pain [Palpitations] : no palpitations [Orthopena] : no orthopnea [Shortness Of Breath] : no shortness of breath [Wheezing] : no wheezing [Abdominal Pain] : no abdominal pain [Nausea] : no nausea [Vomiting] : no vomiting [Heartburn] : no heartburn [Dysuria] : no dysuria [Incontinence] : no incontinence [Hematuria] : no hematuria [Joint Pain] : no joint pain [Back Pain] : no back pain [Headache] : no headache [Dizziness] : no dizziness [Memory Loss] : no memory loss [Suicidal] : not suicidal [Easy Bleeding] : no easy bleeding [de-identified] : anal itching

## 2020-11-30 NOTE — PHYSICAL EXAM
[No Acute Distress] : no acute distress [Well Nourished] : well nourished [Well Developed] : well developed [Normal Sclera/Conjunctiva] : normal sclera/conjunctiva [Normal Outer Ear/Nose] : the outer ears and nose were normal in appearance [Supple] : supple [No Respiratory Distress] : no respiratory distress  [No Accessory Muscle Use] : no accessory muscle use [Clear to Auscultation] : lungs were clear to auscultation bilaterally [Normal Rate] : normal rate  [Regular Rhythm] : with a regular rhythm [No Edema] : there was no peripheral edema [Soft] : abdomen soft [Non Tender] : non-tender [No Spinal Tenderness] : no spinal tenderness [Grossly Normal Strength/Tone] : grossly normal strength/tone [No Rash] : no rash [No Focal Deficits] : no focal deficits [Normal Affect] : the affect was normal

## 2020-11-30 NOTE — PLAN
[FreeTextEntry1] : 64 years old male known to have gastritis, hepatic steatosis, sigmoid diverticulosis, internal hemorrhoids, colon polyps (3 mm cecum and 5 m in the D.colon), plantar fascitis, skin tags s/p cauterization presented to establish care. Pt endorses anal itching x 4 months.\par \par \par # Anal itching\par Pt advised to stop applying hydrocortisone\par lab workup and HIV test ordered\par fu in 4 months, if no relieve would send to GI for anoscopy and get stool for ova and parasites\par \par \par # Colon polyps\par colonoscopy 6/13/19 dr colvin good prep, 3 mm cecal polyp (mucosa), 5 mm DC TA, mild sigmoid diverticulosis, internal hemorrhoids, SC erythema (mild inflammation on bx) \par colonoscopy in 5 years \par \par \par # Internal hemorrhoids, sigmoid diverticulosis\par Pt advised on lifestyle and dietary modification\par \par \par # Obesity, hep steatosis on ct abd 2017 \par weight loss and healthy diet advised \par dietician referral \par checl LFTs yearly \par \par # hepatic steatosis on ct abd 2016\par weight loss and exercise recommended \par \par \par HCM\par lab workup ordered\par fu in 4 months\par

## 2020-11-30 NOTE — HISTORY OF PRESENT ILLNESS
[FreeTextEntry1] : to establish care [de-identified] : 64 years old male known to have gastritis, hepatic steatosis, sigmoid diverticulosis, internal hemorrhoids, colon polyps (3 mm cecum and 5 m in the D.colon), plantar fascitis, skin tags s/p cauterization presented to establish care.\par \par As per pt, he has been anal itching for last 4 months. He was prescribed hydrocortisone cream 1% by Dr Snyder? a friend which he has been using once a day x last 4 months. Pt currently denies any anal itching but continues to use the cream. Pt is currently not sexually active (last 1 year ago, with wife), denies any prior hx of STI, NOCTURNAL SYMPTOMS, bright red blood per rectum, change in bowel habit or change in appetite or weight, fecal seepage, anal discharge, burning, fever, night sweats, use of any prescribed or OTC medicines other than the cream.\par Itching occurred in the day time.\par ZORAIDA showed a single skin tag without external hemorrhoids, excoriation, discharge.\par \par egd 20 yrs ago in [facility] gastritis \par colonoscopy 6/13/19 dr colvin good prep, 3 mm cecal polyp (mucosa), 5 mm DC TA, mild sigmoid diverticulosis, internal hemorrhoids, SC erythema (mild inflammation on bx) \par FH negative

## 2021-02-08 ENCOUNTER — APPOINTMENT (OUTPATIENT)
Dept: SURGERY | Facility: CLINIC | Age: 66
End: 2021-02-08
Payer: SUBSIDIZED

## 2021-02-08 VITALS
DIASTOLIC BLOOD PRESSURE: 75 MMHG | SYSTOLIC BLOOD PRESSURE: 125 MMHG | BODY MASS INDEX: 33.79 KG/M2 | TEMPERATURE: 96.9 F | WEIGHT: 179 LBS | HEIGHT: 61 IN | HEART RATE: 76 BPM

## 2021-02-08 PROCEDURE — 99072 ADDL SUPL MATRL&STAF TM PHE: CPT

## 2021-02-08 PROCEDURE — 46600 DIAGNOSTIC ANOSCOPY SPX: CPT

## 2021-02-08 PROCEDURE — 99204 OFFICE O/P NEW MOD 45 MIN: CPT | Mod: 25

## 2021-02-11 NOTE — CONSULT LETTER
[Dear  ___] : Dear  [unfilled], [Consult Letter:] : I had the pleasure of evaluating your patient, [unfilled]. [Consult Closing:] : Thank you very much for allowing me to participate in the care of this patient.  If you have any questions, please do not hesitate to contact me. [Please see my note below.] : Please see my note below. [FreeTextEntry3] : Sincerely,\par \par Juanjo Bah MD, Colon and Rectal Surgery\par \par [employer] [facility]\par [address] [place] [address]\par [facility], 3rd Floor\par [place], [place] [address]\par Tel [phone] ext [phone]\par Fax [phone]\par

## 2021-02-11 NOTE — HISTORY OF PRESENT ILLNESS
[FreeTextEntry1] : Patient is a 65M with PMH of hepatic steatosis who presents for perianal itching. 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  He has noticed a bump in the area and significant itching. He states he cleans and places hydrocortisone cream on the area multiple times per day.  This provides only temporary relief. Otherwise he feels well. Patient denies fevers, chills, nausea, vomiting, abdominal pain, constipation, diarrhea, blood in his stool or unexpected weight loss.  Patient denies a family history of colon cancer rectal cancer or inflammatory bowel disease.  Patient's last colonoscopy was 8/2020 with Dr. Catherine that showed benign polyps, diverticular disease and hemorrhoids.

## 2021-02-11 NOTE — PHYSICAL EXAM
[Abdomen Masses] : No abdominal masses [Abdomen Tenderness] : ~T No ~M abdominal tenderness [No HSM] : no hepatosplenomegaly [Excoriation] : excoriations [Fistula] : no fistulas [Wart] : no warts [Ulcer ___ cm] : no ulcers [Pilonidal Cyst] : no pilonidal cysts [Tender, Swollen] : nontender, non-swollen [Thrombosed] : that was not thrombosed [Skin Tags] : there were no residual hemorrhoidal skin tags seen [Normal] : was normal [None] : there was no rectal mass  [Respiratory Effort] : normal respiratory effort [Normal Rate and Rhythm] : normal rate and rhythm [de-identified] : external examination shows an anterior skin tag and irritated skin with micro abrasions in the anterior midline. [de-identified] : awake, alert and in no acute distress

## 2021-02-11 NOTE — PROCEDURE
[FreeTextEntry1] : ZORAIDA and anoscopy show normal sphincter tone, normal internal hemorrhoids and no masses.\par

## 2021-02-11 NOTE — ASSESSMENT
[FreeTextEntry1] : 65M with pruritus ani\par \par I discussed the pathology of pruritus ani with the patient.  Due to the patients cleaning habits there are multiple small excoriations of the perianal skin. I recommended cleaning with only water and a damp cloth, no soap or other topical agents on the area and calmoseptine as a barrier cream.  We will reassess the patient in 2 month.\par

## 2021-04-05 ENCOUNTER — APPOINTMENT (OUTPATIENT)
Dept: SURGERY | Facility: CLINIC | Age: 66
End: 2021-04-05
Payer: SUBSIDIZED

## 2021-04-05 VITALS
HEART RATE: 72 BPM | HEIGHT: 61 IN | TEMPERATURE: 97.3 F | DIASTOLIC BLOOD PRESSURE: 70 MMHG | WEIGHT: 184 LBS | SYSTOLIC BLOOD PRESSURE: 126 MMHG | BODY MASS INDEX: 34.74 KG/M2

## 2021-04-05 PROCEDURE — 99072 ADDL SUPL MATRL&STAF TM PHE: CPT

## 2021-04-05 PROCEDURE — 99213 OFFICE O/P EST LOW 20 MIN: CPT

## 2021-04-08 RX ORDER — HYDROCORTISONE 25 MG/G
2.5 CREAM TOPICAL
Qty: 60 | Refills: 2 | Status: ACTIVE | COMMUNITY
Start: 2021-04-05

## 2021-04-08 RX ORDER — HYDROCORTISONE 25 MG/G
2.5 CREAM TOPICAL
Qty: 60 | Refills: 0 | Status: DISCONTINUED | COMMUNITY
Start: 2020-10-15

## 2021-04-08 NOTE — PHYSICAL EXAM
[Abdomen Masses] : No abdominal masses [Abdomen Tenderness] : ~T No ~M abdominal tenderness [No HSM] : no hepatosplenomegaly [Excoriation] : no perianal excoriation [Fistula] : no fistulas [Wart] : no warts [Ulcer ___ cm] : no ulcers [Pilonidal Cyst] : no pilonidal cysts [Tender, Swollen] : nontender, non-swollen [Thrombosed] : that was not thrombosed [Skin Tags] : there were no residual hemorrhoidal skin tags seen [Normal] : was normal [None] : there was no rectal mass  [Respiratory Effort] : normal respiratory effort [Normal Rate and Rhythm] : normal rate and rhythm [de-identified] : external examination shows an anterior skin tag and irritated skin that is much improved.  [de-identified] : awake, alert and in no acute distress

## 2021-04-08 NOTE — HISTORY OF PRESENT ILLNESS
[FreeTextEntry1] : Patient is a 65M with PMH of hepatic steatosis who presents for follow up of perianal itching.  On his last visit he was found to have irritation in the anterior location.  He was recommended to use calmoseptine.  He states this helped a great deal but his itching and come back. Patient denies fevers, chills, nausea, vomiting, abdominal pain, constipation, diarrhea, blood in his stool or unexpected weight loss.  Patient denies a family history of colon cancer rectal cancer or inflammatory bowel disease.  Patient's last colonoscopy was 8/2020 with Dr. Catherine that showed benign polyps, diverticular disease and hemorrhoids.

## 2021-04-08 NOTE — ASSESSMENT
[FreeTextEntry1] : 65M with pruritus ani\par \par I spoke to the patient about his condition.  I recommended that he keep a food diary to determine if there is a dietary component. Additionally he was recommended to minimize management of the area and use hydrocortisone cream at this time.  We will see him back in 1-2 months.

## 2021-04-08 NOTE — CONSULT LETTER
[Dear  ___] : Dear  [unfilled], [Courtesy Letter:] : I had the pleasure of seeing your patient, [unfilled], in my office today. [Please see my note below.] : Please see my note below. [Consult Closing:] : Thank you very much for allowing me to participate in the care of this patient.  If you have any questions, please do not hesitate to contact me. [FreeTextEntry3] : Sincerely,\par \par Juanjo Bah MD, Colon and Rectal Surgery\par \par [employer] [facility]\par [address] [place] [address]\par [facility], 3rd Floor\par [place], [place] [address]\par Tel [phone] ext [phone]\par Fax [phone]\par

## 2021-04-09 ENCOUNTER — OUTPATIENT (OUTPATIENT)
Dept: OUTPATIENT SERVICES | Facility: HOSPITAL | Age: 66
LOS: 1 days | Discharge: HOME | End: 2021-04-09

## 2021-04-09 ENCOUNTER — APPOINTMENT (OUTPATIENT)
Dept: INTERNAL MEDICINE | Facility: CLINIC | Age: 66
End: 2021-04-09
Payer: COMMERCIAL

## 2021-04-09 VITALS
TEMPERATURE: 97.1 F | HEIGHT: 60 IN | OXYGEN SATURATION: 97 % | WEIGHT: 180 LBS | DIASTOLIC BLOOD PRESSURE: 77 MMHG | BODY MASS INDEX: 35.34 KG/M2 | SYSTOLIC BLOOD PRESSURE: 122 MMHG | HEART RATE: 64 BPM

## 2021-04-09 DIAGNOSIS — K63.5 POLYP OF COLON: ICD-10-CM

## 2021-04-09 DIAGNOSIS — Z98.890 OTHER SPECIFIED POSTPROCEDURAL STATES: Chronic | ICD-10-CM

## 2021-04-09 LAB
ALBUMIN SERPL ELPH-MCNC: 4.8 G/DL
ALP BLD-CCNC: 118 U/L
ALT SERPL-CCNC: 24 U/L
ANION GAP SERPL CALC-SCNC: 12 MMOL/L
AST SERPL-CCNC: 24 U/L
BASOPHILS # BLD AUTO: 0.05 K/UL
BASOPHILS NFR BLD AUTO: 0.8 %
BILIRUB SERPL-MCNC: 0.3 MG/DL
BUN SERPL-MCNC: 20 MG/DL
CALCIUM SERPL-MCNC: 9.3 MG/DL
CHLORIDE SERPL-SCNC: 102 MMOL/L
CHOLEST SERPL-MCNC: 204 MG/DL
CO2 SERPL-SCNC: 21 MMOL/L
CREAT SERPL-MCNC: 1.2 MG/DL
EOSINOPHIL # BLD AUTO: 0.08 K/UL
EOSINOPHIL NFR BLD AUTO: 1.3 %
ESTIMATED AVERAGE GLUCOSE: 128 MG/DL
GLUCOSE SERPL-MCNC: 96 MG/DL
HBA1C MFR BLD HPLC: 6.1 %
HCT VFR BLD CALC: 46.7 %
HDLC SERPL-MCNC: 37 MG/DL
HGB BLD-MCNC: 15.6 G/DL
IMM GRANULOCYTES NFR BLD AUTO: 1.3 %
LDLC SERPL CALC-MCNC: 143 MG/DL
LYMPHOCYTES # BLD AUTO: 1.98 K/UL
LYMPHOCYTES NFR BLD AUTO: 32.8 %
MAN DIFF?: NORMAL
MCHC RBC-ENTMCNC: 29.2 PG
MCHC RBC-ENTMCNC: 33.4 G/DL
MCV RBC AUTO: 87.3 FL
MONOCYTES # BLD AUTO: 0.48 K/UL
MONOCYTES NFR BLD AUTO: 8 %
NEUTROPHILS # BLD AUTO: 3.36 K/UL
NEUTROPHILS NFR BLD AUTO: 55.8 %
NONHDLC SERPL-MCNC: 167 MG/DL
PLATELET # BLD AUTO: 254 K/UL
POTASSIUM SERPL-SCNC: 4.4 MMOL/L
PROT SERPL-MCNC: 8.2 G/DL
RBC # BLD: 5.35 M/UL
RBC # FLD: 13.2 %
SODIUM SERPL-SCNC: 135 MMOL/L
TRIGL SERPL-MCNC: 203 MG/DL
WBC # FLD AUTO: 6.03 K/UL

## 2021-04-09 PROCEDURE — 99212 OFFICE O/P EST SF 10 MIN: CPT | Mod: GC

## 2021-04-09 NOTE — PHYSICAL EXAM
[No Acute Distress] : no acute distress [Well Nourished] : well nourished [No JVD] : no jugular venous distention [No Lymphadenopathy] : no lymphadenopathy [No Respiratory Distress] : no respiratory distress  [No Accessory Muscle Use] : no accessory muscle use [Normal Rate] : normal rate  [Regular Rhythm] : with a regular rhythm [Soft] : abdomen soft [Non Tender] : non-tender [Non-distended] : non-distended [Normal Sphincter Tone] : normal sphincter tone [No Mass] : no mass [No CVA Tenderness] : no CVA  tenderness [No Joint Swelling] : no joint swelling [FreeTextEntry1] : no erythema, no skin changes, no tags, small

## 2021-04-09 NOTE — ASSESSMENT
[FreeTextEntry1] : 65 years old male known to have gastritis, hepatic steatosis, sigmoid diverticulosis, internal hemorrhoids, colon polyps (3 mm cecum and 5 m in the D.colon), plantar fascitis, skin tags s/p cauterization\par \par # anal itching \par Unclear etiology at this point, could be due to internal hemorrhoids? \par - seen by colorectal surgery --> recommend  food diary to determine if there is a dietary component. Additionally he was recommended to minimize management of the area and use hydrocortisone cream at this time\par - continue hydrocortisone \par - f.u with colorectal surgery in 1 month\par - consider banding of hemorrhoids if no improvement \par \par # Internal hemorrhoids, sigmoid diverticulosis\par -  lifestyle and dietary modification\par \par # Obesity, hep steatosis on ct abd 2017 \par - weight loss and healthy diet advised \par - check LFTs yearly (last in 1/20) --> will repeat\par \par # hepatic steatosis on ct abd 2016\par weight loss and exercise recommended \par \par # HCM\par - colonoscopy 6/19 dr colvin good prep, 5 polyps -->  colonoscopy in 5 years \par - covid: not taken \par f/u in 6 months with routine blood work \par \par \par

## 2021-04-09 NOTE — REVIEW OF SYSTEMS
[Constipation] : constipation [Fever] : no fever [Chills] : no chills [Chest Pain] : no chest pain [Shortness Of Breath] : no shortness of breath [Cough] : no cough [Abdominal Pain] : no abdominal pain [Nausea] : no nausea [Diarrhea] : no diarrhea [Vomiting] : no vomiting [Melena] : no melena [Dysuria] : no dysuria

## 2021-04-09 NOTE — HISTORY OF PRESENT ILLNESS
[[employer] ] : provided by [employer]   [FreeTextEntry1] : [id number] [de-identified] : 65 years old male known to have gastritis, hepatic steatosis, sigmoid diverticulosis, internal hemorrhoids, colon polyps (3 mm cecum and 5 m in the D.colon), plantar fascitis, skin tags s/p cauterization. \par \par Patient presenting for follow up. \par Interval history: seen by colorectal surgery for anal itching. Patient still reports itching, despite hydrocortisone cream prescribed by colorectal surgery. Has not noticed any improvement/ worsening based on diet.  Patient reports occasional constipation. \par Patient reports that all these symptoms started after his colonoscopy. \par Patient is sexually active, no anal sex.

## 2021-04-22 DIAGNOSIS — K63.5 POLYP OF COLON: ICD-10-CM

## 2021-04-22 DIAGNOSIS — K76.0 FATTY (CHANGE OF) LIVER, NOT ELSEWHERE CLASSIFIED: ICD-10-CM

## 2021-05-24 ENCOUNTER — APPOINTMENT (OUTPATIENT)
Dept: SURGERY | Facility: CLINIC | Age: 66
End: 2021-05-24

## 2021-05-26 ENCOUNTER — APPOINTMENT (OUTPATIENT)
Dept: SURGERY | Facility: CLINIC | Age: 66
End: 2021-05-26
Payer: SUBSIDIZED

## 2021-05-26 VITALS
BODY MASS INDEX: 36.12 KG/M2 | SYSTOLIC BLOOD PRESSURE: 132 MMHG | WEIGHT: 184 LBS | HEIGHT: 60 IN | HEART RATE: 73 BPM | DIASTOLIC BLOOD PRESSURE: 78 MMHG | TEMPERATURE: 97.3 F

## 2021-05-26 PROCEDURE — 99072 ADDL SUPL MATRL&STAF TM PHE: CPT

## 2021-05-26 PROCEDURE — 99212 OFFICE O/P EST SF 10 MIN: CPT

## 2021-05-26 NOTE — HISTORY OF PRESENT ILLNESS
[FreeTextEntry1] : Patient is a 66M with PMH of hepatic steatosis who presents for follow up of perianal itching.  He was previously found to have irritation in the anterior location.  He was recommended to use calmoseptine and then hydrocortisone. He presents today stating his itching has resolved.   Patient denies fevers, chills, nausea, vomiting, abdominal pain, constipation, diarrhea, blood in his stool or unexpected weight loss.  Patient denies a family history of colon cancer rectal cancer or inflammatory bowel disease.  Patient's last colonoscopy was 8/2020 with Dr. Catherine that showed benign polyps, diverticular disease and hemorrhoids.

## 2021-05-26 NOTE — PHYSICAL EXAM
[Abdomen Masses] : No abdominal masses [Abdomen Tenderness] : ~T No ~M abdominal tenderness [No HSM] : no hepatosplenomegaly [Excoriation] : no perianal excoriation [Fistula] : no fistulas [Wart] : no warts [Ulcer ___ cm] : no ulcers [Pilonidal Cyst] : no pilonidal cysts [Tender, Swollen] : nontender, non-swollen [Thrombosed] : that was not thrombosed [Skin Tags] : there were no residual hemorrhoidal skin tags seen [Normal] : was normal [None] : there was no rectal mass  [Respiratory Effort] : normal respiratory effort [Normal Rate and Rhythm] : normal rate and rhythm [de-identified] : external examination shows an anterior skin tag  [de-identified] : awake, alert and in no acute distress

## 2021-05-26 NOTE — ASSESSMENT
[FreeTextEntry1] : 65M with pruritus ani\par \par The patient has improved.  I recommended that he use the creams PRN.  He would like to return in 6 months.

## 2021-05-26 NOTE — CONSULT LETTER
[Dear  ___] : Dear  [unfilled], [Courtesy Letter:] : I had the pleasure of seeing your patient, [unfilled], in my office today. [Please see my note below.] : Please see my note below. [Consult Closing:] : Thank you very much for allowing me to participate in the care of this patient.  If you have any questions, please do not hesitate to contact me. [FreeTextEntry3] : Sincerely,\par \par Juanjo Bah MD, Colon and Rectal Surgery\par \par [employer] [facility]\par [address] [place] [address]\par [facility], 3rd Floor\par [place], [place] [address]\par Tel [phone] ext [phone]\par Fax [phone]\par

## 2021-10-13 ENCOUNTER — OUTPATIENT (OUTPATIENT)
Dept: OUTPATIENT SERVICES | Facility: HOSPITAL | Age: 66
LOS: 1 days | Discharge: HOME | End: 2021-10-13

## 2021-10-13 ENCOUNTER — NON-APPOINTMENT (OUTPATIENT)
Age: 66
End: 2021-10-13

## 2021-10-13 ENCOUNTER — APPOINTMENT (OUTPATIENT)
Dept: INTERNAL MEDICINE | Facility: CLINIC | Age: 66
End: 2021-10-13
Payer: COMMERCIAL

## 2021-10-13 VITALS
HEART RATE: 75 BPM | BODY MASS INDEX: 36.91 KG/M2 | DIASTOLIC BLOOD PRESSURE: 87 MMHG | SYSTOLIC BLOOD PRESSURE: 144 MMHG | HEIGHT: 60 IN | WEIGHT: 188 LBS | TEMPERATURE: 97.1 F

## 2021-10-13 DIAGNOSIS — Z98.890 OTHER SPECIFIED POSTPROCEDURAL STATES: Chronic | ICD-10-CM

## 2021-10-13 DIAGNOSIS — Z23 ENCOUNTER FOR IMMUNIZATION: ICD-10-CM

## 2021-10-13 LAB
ALBUMIN SERPL ELPH-MCNC: 4.2 G/DL
ALP BLD-CCNC: 130 U/L
ALT SERPL-CCNC: <5 U/L
ANION GAP SERPL CALC-SCNC: 10 MMOL/L
AST SERPL-CCNC: <5 U/L
BASOPHILS # BLD AUTO: 0.07 K/UL
BASOPHILS NFR BLD AUTO: 0.8 %
BILIRUB SERPL-MCNC: <0.2 MG/DL
BUN SERPL-MCNC: 10 MG/DL
CALCIUM SERPL-MCNC: 9.4 MG/DL
CHLORIDE SERPL-SCNC: 97 MMOL/L
CHOLEST SERPL-MCNC: 253 MG/DL
CO2 SERPL-SCNC: 25 MMOL/L
CREAT SERPL-MCNC: 1 MG/DL
EOSINOPHIL # BLD AUTO: 0.12 K/UL
EOSINOPHIL NFR BLD AUTO: 1.4 %
ESTIMATED AVERAGE GLUCOSE: 137 MG/DL
GLUCOSE SERPL-MCNC: 107 MG/DL
HBA1C MFR BLD HPLC: 6.4 %
HCT VFR BLD CALC: 43.8 %
HDLC SERPL-MCNC: 24 MG/DL
HGB BLD-MCNC: 15.9 G/DL
IMM GRANULOCYTES NFR BLD AUTO: 2.4 %
LDLC SERPL CALC-MCNC: 65 MG/DL
LYMPHOCYTES # BLD AUTO: 2.43 K/UL
LYMPHOCYTES NFR BLD AUTO: 29.3 %
MAN DIFF?: NORMAL
MCHC RBC-ENTMCNC: 31.9 PG
MCHC RBC-ENTMCNC: 36.3 G/DL
MCV RBC AUTO: 87.8 FL
MONOCYTES # BLD AUTO: 0.67 K/UL
MONOCYTES NFR BLD AUTO: 8.1 %
NEUTROPHILS # BLD AUTO: 4.8 K/UL
NEUTROPHILS NFR BLD AUTO: 58 %
NONHDLC SERPL-MCNC: 229 MG/DL
PLATELET # BLD AUTO: 255 K/UL
POTASSIUM SERPL-SCNC: 4.7 MMOL/L
PROT SERPL-MCNC: 7.4 G/DL
RBC # BLD: 4.99 M/UL
RBC # FLD: 13.2 %
SODIUM SERPL-SCNC: 132 MMOL/L
TRIGL SERPL-MCNC: 2040 MG/DL
TSH SERPL-ACNC: 5.18 UIU/ML
WBC # FLD AUTO: 8.29 K/UL

## 2021-10-13 PROCEDURE — 99213 OFFICE O/P EST LOW 20 MIN: CPT | Mod: GC

## 2021-10-13 NOTE — ASSESSMENT
[FreeTextEntry1] : 66 years old male known to have gastritis, DLD, prediabetes, hepatic steatosis, sigmoid diverticulosis, internal hemorrhoids, colon polyps (3 mm cecum and 5 m in the D.colon) presents for follow up.\par \par # anal itching \par - resolved with hydrocortisone cream\par - was following with colorectal surgery\par \par # Dyslipidemia\par - ASCVD score 30\par - start high intensity statins\par - Monitor LFTs\par \par # Internal hemorrhoids, sigmoid diverticulosis\par -  lifestyle and dietary modification\par \par # Obesity, hep steatosis on ct abd 2017 \par - weight loss and healthy diet advised \par - check LFTs yearly (last in 1/20) --> will repeat\par \par # hepatic steatosis on ct abd 2016\par weight loss and exercise recommended \par \par # HCM\par - colonoscopy 6/19 dr colvin good prep, 5 polyps -->  colonoscopy in 5 years \par - covid vaccine 2 shots taken\par - Flu vaccine given today\par f/u in 6 months and prn with routine blood work \par \par \par

## 2021-10-13 NOTE — HISTORY OF PRESENT ILLNESS
[FreeTextEntry1] : follow up [de-identified] : 66 years old male known to have gastritis, hepatic steatosis, sigmoid diverticulosis, internal hemorrhoids, DLD, prediabetes, colon polyps (3 mm cecum and 5 m in the D.colon) presents for follow up. Patient has been complaining of anal itchiness during the last visit, he was seen by colorectal surgery who prescribed him hydrocortisone cream that improved his symptoms. No other complaints.

## 2021-10-14 LAB — 25(OH)D3 SERPL-MCNC: 12 NG/ML

## 2021-10-15 DIAGNOSIS — K76.0 FATTY (CHANGE OF) LIVER, NOT ELSEWHERE CLASSIFIED: ICD-10-CM

## 2021-10-15 DIAGNOSIS — Z00.00 ENCOUNTER FOR GENERAL ADULT MEDICAL EXAMINATION WITHOUT ABNORMAL FINDINGS: ICD-10-CM

## 2021-10-15 DIAGNOSIS — R73.03 PREDIABETES: ICD-10-CM

## 2021-10-15 DIAGNOSIS — E78.5 HYPERLIPIDEMIA, UNSPECIFIED: ICD-10-CM

## 2021-10-15 DIAGNOSIS — L29.0 PRURITUS ANI: ICD-10-CM

## 2021-10-20 ENCOUNTER — OUTPATIENT (OUTPATIENT)
Dept: OUTPATIENT SERVICES | Facility: HOSPITAL | Age: 66
LOS: 1 days | Discharge: HOME | End: 2021-10-20

## 2021-10-20 DIAGNOSIS — Z98.890 OTHER SPECIFIED POSTPROCEDURAL STATES: Chronic | ICD-10-CM

## 2021-10-21 DIAGNOSIS — K02.53 DENTAL CARIES ON PIT AND FISSURE SURFACE PENETRATING INTO PULP: ICD-10-CM

## 2021-11-15 ENCOUNTER — APPOINTMENT (OUTPATIENT)
Dept: SURGERY | Facility: CLINIC | Age: 66
End: 2021-11-15
Payer: COMMERCIAL

## 2021-11-15 VITALS
TEMPERATURE: 97.9 F | SYSTOLIC BLOOD PRESSURE: 132 MMHG | WEIGHT: 186 LBS | BODY MASS INDEX: 36.52 KG/M2 | HEART RATE: 72 BPM | HEIGHT: 60 IN | OXYGEN SATURATION: 98 % | DIASTOLIC BLOOD PRESSURE: 84 MMHG

## 2021-11-15 PROCEDURE — 99213 OFFICE O/P EST LOW 20 MIN: CPT

## 2021-11-15 PROCEDURE — 99072 ADDL SUPL MATRL&STAF TM PHE: CPT

## 2021-11-15 RX ORDER — HYDROCORTISONE 25 MG/G
2.5 CREAM TOPICAL
Qty: 60 | Refills: 2 | Status: ACTIVE | COMMUNITY
Start: 2021-11-15 | End: 1900-01-01

## 2021-11-15 NOTE — HISTORY OF PRESENT ILLNESS
[FreeTextEntry1] : Patient is a 66M with PMH of hepatic steatosis who presents for follow up of perianal itching. On his last visit he was using hydrocortisone cream and the itching resolved.  He presents today stating he has a few days of mild itching. Patient denies fevers, chills, nausea, vomiting, abdominal pain, constipation, diarrhea, blood in his stool or unexpected weight loss.  Patient denies a family history of colon cancer rectal cancer or inflammatory bowel disease.  Patient's last colonoscopy was 8/2020 with Dr. Catherine that showed benign polyps, diverticular disease and hemorrhoids.

## 2021-11-15 NOTE — PHYSICAL EXAM
[Abdomen Masses] : No abdominal masses [Abdomen Tenderness] : ~T No ~M abdominal tenderness [No HSM] : no hepatosplenomegaly [Excoriation] : no perianal excoriation [Fistula] : no fistulas [Wart] : no warts [Ulcer ___ cm] : no ulcers [Pilonidal Cyst] : no pilonidal cysts [Tender, Swollen] : nontender, non-swollen [Thrombosed] : that was not thrombosed [Skin Tags] : there were no residual hemorrhoidal skin tags seen [Normal] : was normal [None] : there was no rectal mass  [Respiratory Effort] : normal respiratory effort [Normal Rate and Rhythm] : normal rate and rhythm [de-identified] : external examination shows an anterior skin tag  [de-identified] : awake, alert and in no acute distress

## 2021-11-15 NOTE — ASSESSMENT
[FreeTextEntry1] : 66M with pruritus ani\par \par The patient is asking for a refill on his hydrocortisone cream.  We will see him back as needed.

## 2022-01-31 ENCOUNTER — APPOINTMENT (OUTPATIENT)
Dept: OPHTHALMOLOGY | Facility: CLINIC | Age: 67
End: 2022-01-31

## 2022-01-31 ENCOUNTER — OUTPATIENT (OUTPATIENT)
Dept: OUTPATIENT SERVICES | Facility: HOSPITAL | Age: 67
LOS: 1 days | Discharge: HOME | End: 2022-01-31
Payer: SUBSIDIZED

## 2022-01-31 DIAGNOSIS — Z98.890 OTHER SPECIFIED POSTPROCEDURAL STATES: Chronic | ICD-10-CM

## 2022-01-31 PROCEDURE — 92004 COMPRE OPH EXAM NEW PT 1/>: CPT

## 2022-02-12 DIAGNOSIS — H35.013 CHANGES IN RETINAL VASCULAR APPEARANCE, BILATERAL: ICD-10-CM

## 2022-02-12 DIAGNOSIS — H25.13 AGE-RELATED NUCLEAR CATARACT, BILATERAL: ICD-10-CM

## 2022-02-12 DIAGNOSIS — H52.4 PRESBYOPIA: ICD-10-CM

## 2022-02-12 DIAGNOSIS — H10.45 OTHER CHRONIC ALLERGIC CONJUNCTIVITIS: ICD-10-CM

## 2022-03-23 ENCOUNTER — OUTPATIENT (OUTPATIENT)
Dept: OUTPATIENT SERVICES | Facility: HOSPITAL | Age: 67
LOS: 1 days | Discharge: HOME | End: 2022-03-23

## 2022-03-23 DIAGNOSIS — Z98.890 OTHER SPECIFIED POSTPROCEDURAL STATES: Chronic | ICD-10-CM

## 2022-04-13 ENCOUNTER — APPOINTMENT (OUTPATIENT)
Dept: INTERNAL MEDICINE | Facility: CLINIC | Age: 67
End: 2022-04-13
Payer: COMMERCIAL

## 2022-04-13 ENCOUNTER — OUTPATIENT (OUTPATIENT)
Dept: OUTPATIENT SERVICES | Facility: HOSPITAL | Age: 67
LOS: 1 days | Discharge: HOME | End: 2022-04-13

## 2022-04-13 ENCOUNTER — RESULT REVIEW (OUTPATIENT)
Age: 67
End: 2022-04-13

## 2022-04-13 VITALS
HEIGHT: 60 IN | HEART RATE: 83 BPM | DIASTOLIC BLOOD PRESSURE: 84 MMHG | SYSTOLIC BLOOD PRESSURE: 138 MMHG | WEIGHT: 186 LBS | TEMPERATURE: 98.1 F | BODY MASS INDEX: 36.52 KG/M2

## 2022-04-13 DIAGNOSIS — L29.0 PRURITUS ANI: ICD-10-CM

## 2022-04-13 DIAGNOSIS — Z98.890 OTHER SPECIFIED POSTPROCEDURAL STATES: Chronic | ICD-10-CM

## 2022-04-13 DIAGNOSIS — R73.03 PREDIABETES.: ICD-10-CM

## 2022-04-13 DIAGNOSIS — E78.5 HYPERLIPIDEMIA, UNSPECIFIED: ICD-10-CM

## 2022-04-13 DIAGNOSIS — R73.03 PREDIABETES: ICD-10-CM

## 2022-04-13 LAB
ALBUMIN SERPL ELPH-MCNC: 4.7 G/DL
ALP BLD-CCNC: 127 U/L
ALT SERPL-CCNC: 34 U/L
ANION GAP SERPL CALC-SCNC: 15 MMOL/L
AST SERPL-CCNC: 26 U/L
BASOPHILS # BLD AUTO: 0.05 K/UL
BASOPHILS NFR BLD AUTO: 0.6 %
BILIRUB SERPL-MCNC: 0.7 MG/DL
BUN SERPL-MCNC: 14 MG/DL
CALCIUM SERPL-MCNC: 10 MG/DL
CHLORIDE SERPL-SCNC: 100 MMOL/L
CHOLEST SERPL-MCNC: 226 MG/DL
CO2 SERPL-SCNC: 22 MMOL/L
CREAT SERPL-MCNC: 1.1 MG/DL
EGFR: 74 ML/MIN/1.73M2
EOSINOPHIL # BLD AUTO: 0.07 K/UL
EOSINOPHIL NFR BLD AUTO: 0.8 %
ESTIMATED AVERAGE GLUCOSE: 157 MG/DL
GLUCOSE SERPL-MCNC: 109 MG/DL
HBA1C MFR BLD HPLC: 7.1 %
HCT VFR BLD CALC: 48.8 %
HDLC SERPL-MCNC: 44 MG/DL
HGB BLD-MCNC: 15.8 G/DL
IMM GRANULOCYTES NFR BLD AUTO: 1.4 %
LDLC SERPL CALC-MCNC: 133 MG/DL
LYMPHOCYTES # BLD AUTO: 3.04 K/UL
LYMPHOCYTES NFR BLD AUTO: 35.4 %
MAN DIFF?: NORMAL
MCHC RBC-ENTMCNC: 28.9 PG
MCHC RBC-ENTMCNC: 32.4 G/DL
MCV RBC AUTO: 89.4 FL
MONOCYTES # BLD AUTO: 0.82 K/UL
MONOCYTES NFR BLD AUTO: 9.5 %
NEUTROPHILS # BLD AUTO: 4.49 K/UL
NEUTROPHILS NFR BLD AUTO: 52.3 %
NONHDLC SERPL-MCNC: 182 MG/DL
PLATELET # BLD AUTO: 256 K/UL
POTASSIUM SERPL-SCNC: 4.5 MMOL/L
PROT SERPL-MCNC: 8.4 G/DL
RBC # BLD: 5.46 M/UL
RBC # FLD: 13.2 %
SODIUM SERPL-SCNC: 137 MMOL/L
TRIGL SERPL-MCNC: 245 MG/DL
WBC # FLD AUTO: 8.59 K/UL

## 2022-04-13 PROCEDURE — 99214 OFFICE O/P EST MOD 30 MIN: CPT | Mod: GC

## 2022-04-13 NOTE — ASSESSMENT
[FreeTextEntry1] : Mr Rothman is a 66 YOM w a PMH of hepatic steatosis, sigmoid diverticulosis,hypertriglyceridemia( TG 2040, tot Chol 253, LDL 65, pre-DM(A1c 6.4 in 2021), benign colonic polyps (seen in 2020 colonoscopy, 3mm at cecum and 5mm Distal colon) and perianal itching 2/2 hemorrhoids. He mistakenly made this appointment. \par Patient has abdominal distention of PE, tense abdomen, no rebound, no signs of acute abdomen.\par Will refer patient for US and Hepatology referral\par \par #Hepatic Steatosis\par patient unaware of this diagnosis. \par TG greatly elevated \par -c/w atoroastatin 80mg\par -f/u CMP, Lipid pannel\par -6 month f/u\par -hepatology referral\par -RUQ US\par -patient may benefit from theraputic tap\par \par #Pre-DM\par A1c is boarderline\par patient denies increased urinary frequency, increased thirst, peripheral neuropathy\par does endorse vision changes\par -patient due for opthamology visit next month\par -f/w CMP and A1c\par -if A1c> 6.4% start metformin\par \par #Perianal itching\par well controlled w hyrdocortisone cream \par \par #Colonic polyps\par continue to monitor w q5yr colonoscopy \par \par #Healthcare maintenance\par -patient is up to date on colonoscpy\par -never smoker, NO need for CXR or Abdominal Aortic US\par -6 month follow up\par -offer flu shot in 3months \par \par \par

## 2022-04-13 NOTE — REVIEW OF SYSTEMS
[Vision Problems] : vision problems [Joint Pain] : joint pain [Fever] : no fever [Fatigue] : no fatigue [Night Sweats] : no night sweats [Recent Change In Weight] : ~T no recent weight change [Redness] : no redness [Earache] : no earache [Hearing Loss] : no hearing loss [Nasal Discharge] : no nasal discharge [Sore Throat] : no sore throat [Chest Pain] : no chest pain [Palpitations] : no palpitations [Orthopena] : no orthopnea [Paroxysmal Nocturnal Dyspnea] : no paroxysmal nocturnal dyspnea [Shortness Of Breath] : no shortness of breath [Wheezing] : no wheezing [Cough] : no cough [Abdominal Pain] : no abdominal pain [Nausea] : no nausea [Constipation] : no constipation [Vomiting] : no vomiting [Heartburn] : no heartburn [Melena] : no melena [Dysuria] : no dysuria [Incontinence] : no incontinence [Hematuria] : no hematuria [Frequency] : no frequency [Joint Stiffness] : no joint stiffness [Muscle Pain] : no muscle pain [Back Pain] : no back pain [Joint Swelling] : no joint swelling [Itching] : no itching [Mole Changes] : no mole changes [Skin Rash] : no skin rash [Headache] : no headache [Dizziness] : no dizziness [Fainting] : no fainting [Confusion] : no confusion [Unsteady Walk] : no ataxia [Memory Loss] : no memory loss [Suicidal] : not suicidal [Insomnia] : no insomnia [Easy Bleeding] : no easy bleeding [Easy Bruising] : no easy bruising [Swollen Glands] : no swollen glands [FreeTextEntry3] : to be addressed by eye   [FreeTextEntry9] : BL knee pain

## 2022-04-13 NOTE — HISTORY OF PRESENT ILLNESS
[FreeTextEntry1] : f/u  [de-identified] : Mr Rothman is a 66 YOM w a PMH of hepatic steatosis, sigmoid diverticulosis, dyslipidemia, pre-DM, benign colonic polyps (seen in 2020 colonoscopy, 3mm at cecum and 5mm Distal colon) and perianal itching 2/2 hemorrhoids presents for what he thought was an eye dr appointment. While patient was here we discussed his other health issues and he was not aware of his Hx of Hepatic steatosis. Reports increased abdominal distention and no dietary fat restricitions at this time . Denies asterexis , unsteadyness, memory/cognative changes, EtOH use.

## 2022-04-13 NOTE — PHYSICAL EXAM
[No Acute Distress] : no acute distress [Well Nourished] : well nourished [Normal Sclera/Conjunctiva] : normal sclera/conjunctiva [Normal Outer Ear/Nose] : the outer ears and nose were normal in appearance [Normal Oropharynx] : the oropharynx was normal [No JVD] : no jugular venous distention [No Lymphadenopathy] : no lymphadenopathy [Supple] : supple [No Respiratory Distress] : no respiratory distress  [No Accessory Muscle Use] : no accessory muscle use [Normal Rate] : normal rate  [Regular Rhythm] : with a regular rhythm [No Abdominal Bruit] : a ~M bruit was not heard ~T in the abdomen [No Varicosities] : no varicosities [No Edema] : there was no peripheral edema [Non Tender] : non-tender [No Masses] : no abdominal mass palpated [Normal Bowel Sounds] : normal bowel sounds [Declined Rectal Exam] : declined rectal exam [Normal Supraclavicular Nodes] : no supraclavicular lymphadenopathy [Normal Axillary Nodes] : no axillary lymphadenopathy [Normal Posterior Cervical Nodes] : no posterior cervical lymphadenopathy [Normal Anterior Cervical Nodes] : no anterior cervical lymphadenopathy [No CVA Tenderness] : no CVA  tenderness [No Spinal Tenderness] : no spinal tenderness [No Joint Swelling] : no joint swelling [Grossly Normal Strength/Tone] : grossly normal strength/tone [No Rash] : no rash [No Focal Deficits] : no focal deficits [Normal Gait] : normal gait [Speech Grossly Normal] : speech grossly normal [Memory Grossly Normal] : memory grossly normal [Normal Mood] : the mood was normal [de-identified] : abdominal distention w central obesity  [de-identified] : distended rigid abdomen

## 2022-04-23 ENCOUNTER — OUTPATIENT (OUTPATIENT)
Dept: OUTPATIENT SERVICES | Facility: HOSPITAL | Age: 67
LOS: 1 days | Discharge: HOME | End: 2022-04-23
Payer: SUBSIDIZED

## 2022-04-23 DIAGNOSIS — K76.0 FATTY (CHANGE OF) LIVER, NOT ELSEWHERE CLASSIFIED: ICD-10-CM

## 2022-04-23 DIAGNOSIS — Z98.890 OTHER SPECIFIED POSTPROCEDURAL STATES: Chronic | ICD-10-CM

## 2022-04-23 PROCEDURE — 76705 ECHO EXAM OF ABDOMEN: CPT | Mod: 26

## 2022-05-16 ENCOUNTER — APPOINTMENT (OUTPATIENT)
Dept: SURGERY | Facility: CLINIC | Age: 67
End: 2022-05-16

## 2022-05-26 ENCOUNTER — OUTPATIENT (OUTPATIENT)
Dept: OUTPATIENT SERVICES | Facility: HOSPITAL | Age: 67
LOS: 1 days | End: 2022-05-26

## 2022-05-26 DIAGNOSIS — Z98.890 OTHER SPECIFIED POSTPROCEDURAL STATES: Chronic | ICD-10-CM

## 2022-07-18 ENCOUNTER — OUTPATIENT (OUTPATIENT)
Dept: OUTPATIENT SERVICES | Facility: HOSPITAL | Age: 67
LOS: 1 days | Discharge: HOME | End: 2022-07-18

## 2022-07-18 ENCOUNTER — APPOINTMENT (OUTPATIENT)
Dept: HEPATOLOGY | Facility: CLINIC | Age: 67
End: 2022-07-18

## 2022-07-18 VITALS
SYSTOLIC BLOOD PRESSURE: 120 MMHG | OXYGEN SATURATION: 98 % | HEART RATE: 76 BPM | DIASTOLIC BLOOD PRESSURE: 80 MMHG | HEIGHT: 60 IN | BODY MASS INDEX: 32.79 KG/M2 | WEIGHT: 167 LBS

## 2022-07-18 DIAGNOSIS — K59.00 CONSTIPATION, UNSPECIFIED: ICD-10-CM

## 2022-07-18 DIAGNOSIS — Z98.890 OTHER SPECIFIED POSTPROCEDURAL STATES: Chronic | ICD-10-CM

## 2022-07-18 PROCEDURE — 99205 OFFICE O/P NEW HI 60 MIN: CPT | Mod: GC

## 2022-07-18 NOTE — REVIEW OF SYSTEMS
[Shortness Of Breath] : no shortness of breath [Abdominal Pain] : no abdominal pain [Fever] : no fever [Chills] : no chills [Fatigue] : no fatigue [Night Sweats] : no night sweats [Recent Change In Weight] : ~T no recent weight change [Discharge] : no discharge [Pain] : no pain [Redness] : no redness [Dryness] : no dryness [Vision Problems] : no vision problems [Itching] : no itching [Earache] : no earache [Hearing Loss] : no hearing loss [Nosebleeds] : no nosebleeds [Postnasal Drip] : no postnasal drip [Nasal Discharge] : no nasal discharge [Sore Throat] : no sore throat [Hoarseness] : no hoarseness [Chest Pain] : no chest pain [Palpitations] : no palpitations [Claudication] : no  leg claudication [Lower Ext Edema] : no lower extremity edema [Orthopena] : no orthopnea [Paroxysmal Nocturnal Dyspnea] : no paroxysmal nocturnal dyspnea

## 2022-07-18 NOTE — HISTORY OF PRESENT ILLNESS
[FreeTextEntry1] : Hepatic steatosis [de-identified] : Spoke to pt via  My (ID: [id number]). Mr Stephan is a 66 YOM w a PMH of hepatic steatosis, sigmoid diverticulosis, dyslipidemia, pre-DM, benign colonic polyps (seen in 2020 colonoscopy, 3mm at cecum and 5mm Distal colon) and perianal itching 2/2 hemorrhoids who presents for initial evaluation of his hepatic steatosis, referred by Dr. Roldan. He was not originally aware of his Hx of Hepatic steatosis, but Dr. Roldan was able to confirm this diagnosis with US of RUQ abdomen. Reports increased abdominal distention and no dietary fat restricitions at this time. Denies asterexis , unsteadyness, memory/cognative changes, EtOH use.

## 2022-07-18 NOTE — ASSESSMENT
[FreeTextEntry1] : 65 y/o M w a pmhx of hepatic steatosis, sigmoid diverticulosis, dyslipidemia, DM, benign colonic polyps (seen in 2020 colonoscopy, 3mm at cecum and 5mm Distal colon) and perianal itching 2/2 hemorrhoids referred to Hepatology by PCP (Dr. Allan) for fatty liver.\par \par # Non-Alcoholic Hepatic Steatosis - In the setting of Obesity, DM & DLD;\par - Denies abdominal pain or tenderness on exam \par - Labs(4/2022): AST/ALT wnl; \par - RUQ US: Diffusely echogenic liver \par - Last CT Abd in 2016 demonstrating hepatic steatosis; \par >> r/o cholestatic pathology,  autoimmune & Viral hepatitis: GGT, JUNIOR, Anti-smooth muscle Ab, Anti-mitochondrial Ab,  Hepatitis B Core Ab, HepB surface Ag/Ab; \par >> Will need a Fibroscan to assess degree of steatosis;     \par \par # Colonoscopy: Last done on 6/13/19 by Dr. Catherine>> good prep, 3 mm cecal polyp (mucosa), 5 mm DC TA, mild sigmoid diverticulosis, internal hemorrhoids, SC erythema (mild inflammation on bx) \par Repeat colonoscopy in 5 years (2024)\par \par # Patient made aware that he has T2DM, Hypothyroidism & DLD; He will be seeing Dr. Allan on 7/22 to go over labs again and prescribe medications;  \par \par RTC in 6 weeks or PRN;

## 2022-07-18 NOTE — ASSESSMENT
[FreeTextEntry1] : 67 y/o M w a pmhx of hepatic steatosis, sigmoid diverticulosis, dyslipidemia, DM, benign colonic polyps (seen in 2020 colonoscopy, 3mm at cecum and 5mm Distal colon) and perianal itching 2/2 hemorrhoids referred to Hepatology by PCP (Dr. Allan) for fatty liver.\par \par # Non-Alcoholic Hepatic Steatosis - In the setting of Obesity, DM & DLD;\par - Denies abdominal pain or tenderness on exam \par - Labs(4/2022): AST/ALT wnl; \par - RUQ US: Diffusely echogenic liver \par - Last CT Abd in 2016 demonstrating hepatic steatosis; \par >> r/o cholestatic pathology,  autoimmune & Viral hepatitis: GGT, JUNIOR, Anti-smooth muscle Ab, Anti-mitochondrial Ab,  Hepatitis B Core Ab, HepB surface Ag/Ab; \par >> Will need a Fibroscan to assess degree of steatosis;     \par \par # Colonoscopy: Last done on 6/13/19 by Dr. Catherine>> good prep, 3 mm cecal polyp (mucosa), 5 mm DC TA, mild sigmoid diverticulosis, internal hemorrhoids, SC erythema (mild inflammation on bx) \par Repeat colonoscopy in 5 years (2024)\par \par # Patient made aware that he has T2DM, Hypothyroidism & DLD; He will be seeing Dr. Allan on 7/22 to go over labs again and prescribe medications;  \par \par RTC in 6 weeks or PRN;

## 2022-07-18 NOTE — END OF VISIT
[] : Resident [FreeTextEntry3] : Seen with Dr Rose and Dr Dugan\par 67 year old  male with obesity T2DM HLD seen for hepatic steatosis \par Obese\par Abdomen soft non tender\par NAFLD has metabolic syndrome and imaging shows steatosis. Has cholestatic liver tests. \par Cholestatic work up and liver elastography. \par  [Time Spent: ___ minutes] : I have spent [unfilled] minutes of time on the encounter.

## 2022-07-18 NOTE — PHYSICAL EXAM
[General Appearance - Alert] : alert [General Appearance - In No Acute Distress] : in no acute distress [] : no respiratory distress [Abdomen Tenderness] : non-tender

## 2022-07-18 NOTE — HISTORY OF PRESENT ILLNESS
[FreeTextEntry1] : Hepatic steatosis [de-identified] : Spoke to pt via  My (ID: [id number]). Mr Stephan is a 66 YOM w a PMH of hepatic steatosis, sigmoid diverticulosis, dyslipidemia, pre-DM, benign colonic polyps (seen in 2020 colonoscopy, 3mm at cecum and 5mm Distal colon) and perianal itching 2/2 hemorrhoids who presents for initial evaluation of his hepatic steatosis, referred by Dr. Roldan. He was not originally aware of his Hx of Hepatic steatosis, but Dr. Roldan was able to confirm this diagnosis with US of RUQ abdomen. Reports increased abdominal distention and no dietary fat restricitions at this time. Denies asterexis , unsteadyness, memory/cognative changes, EtOH use.

## 2022-07-19 LAB
GGT SERPL-CCNC: 75 U/L
HBV CORE IGM SER QL: NONREACTIVE
HBV SURFACE AB SER QL: NONREACTIVE
HBV SURFACE AG SER QL: NONREACTIVE

## 2022-07-20 LAB
MITOCHONDRIA AB SER IF-ACNC: NORMAL
SMOOTH MUSCLE AB SER QL IF: NORMAL

## 2022-07-21 LAB — ANA SER IF-ACNC: NEGATIVE

## 2022-07-22 ENCOUNTER — NON-APPOINTMENT (OUTPATIENT)
Age: 67
End: 2022-07-22

## 2022-07-22 ENCOUNTER — APPOINTMENT (OUTPATIENT)
Dept: INTERNAL MEDICINE | Facility: CLINIC | Age: 67
End: 2022-07-22

## 2022-07-22 ENCOUNTER — OUTPATIENT (OUTPATIENT)
Dept: OUTPATIENT SERVICES | Facility: HOSPITAL | Age: 67
LOS: 1 days | Discharge: HOME | End: 2022-07-22

## 2022-07-22 VITALS
SYSTOLIC BLOOD PRESSURE: 135 MMHG | OXYGEN SATURATION: 97 % | HEIGHT: 60 IN | BODY MASS INDEX: 35.53 KG/M2 | TEMPERATURE: 97 F | HEART RATE: 78 BPM | DIASTOLIC BLOOD PRESSURE: 85 MMHG | WEIGHT: 181 LBS

## 2022-07-22 DIAGNOSIS — Z98.890 OTHER SPECIFIED POSTPROCEDURAL STATES: Chronic | ICD-10-CM

## 2022-07-22 DIAGNOSIS — Z00.00 ENCOUNTER FOR GENERAL ADULT MEDICAL EXAMINATION W/OUT ABNORMAL FINDINGS: ICD-10-CM

## 2022-07-22 PROCEDURE — 99214 OFFICE O/P EST MOD 30 MIN: CPT | Mod: GC

## 2022-07-22 RX ORDER — ATORVASTATIN CALCIUM 80 MG/1
80 TABLET, FILM COATED ORAL
Qty: 90 | Refills: 1 | Status: DISCONTINUED | COMMUNITY
Start: 2021-10-13 | End: 2022-07-22

## 2022-07-22 RX ORDER — METFORMIN HYDROCHLORIDE 500 MG/1
500 TABLET, COATED ORAL
Qty: 60 | Refills: 5 | Status: ACTIVE | COMMUNITY
Start: 2022-07-22 | End: 1900-01-01

## 2022-07-22 RX ORDER — ATORVASTATIN CALCIUM 40 MG/1
40 TABLET, FILM COATED ORAL DAILY
Qty: 90 | Refills: 3 | Status: ACTIVE | COMMUNITY
Start: 2022-07-22 | End: 1900-01-01

## 2022-07-22 NOTE — HISTORY OF PRESENT ILLNESS
[FreeTextEntry1] : Follow up, lab results  [de-identified] : Mr Rothman is a 67 YOM w a PMH of hepatic steatosis, sigmoid diverticulosis, dyslipidemia, DM, benign colonic polyps (seen in 2020 colonoscopy, 3mm at cecum and 5mm Distal colon) and perianal itching 2/2 hemorrhoids presents for follow up. \par \par Today he reports: \par He went to Hepatology clinic for evaluation of fatty liver. Was told in clinic that he needs to follow up with PCP for recent lab work.\par -a1c now 7.1. Denies urinary frequency, urgency, increased thirst\par - currently not on a statin and LDL elevated although TG improved from prior.  \par \par

## 2022-07-22 NOTE — ASSESSMENT
[FreeTextEntry1] : Mr Stephan is a 66 YOM w a PMH of hepatic steatosis, sigmoid diverticulosis,hypertriglyceridemia, Diabetes, HLD, benign colonic polyps (seen in 2020 colonoscopy, 3mm at cecum and 5mm Distal colon) and perianal itching 2/2 hemorrhoids. \par \par #Hepatic Steatosis possible BLANK\par - Following Hepatology, \par - TG greatly elevated, now 245. LDL  133\par - start atorvastatin 40mg qd\par - f/u CMP, Lipid panel\par - 6 month f/u\par \par #Diabetes\par - A1c now 7.1 \par - patient denies increased urinary frequency, increased thirst, peripheral neuropathy\par - does endorse vision changes\par - Start metformin 500mg BID \par - Sent diabetes supplies to pharmacy. Instructed on checking FS at home\par - Instructed on diabetic diet and exercise. Patient greatly motivated \par - diabetic education referral\par - podiatry referral, went to ophthalmology \par \par #HLD\par - ASCVD risk very highL 34-35% \par - TG greatly elevated, now 245. LDL  133\par - Start atorvastatin 40mg qd. F/U repeat in 6 months \par \par #Perianal itching\par -well controlled w hydrocortisone cream \par \par #Colonic polyps\par continue to monitor w q5yr colonoscopy \par \par #Healthcare maintenance\par -patient is up to date on colonoscpy\par -never smoker, NO need for CXR or Abdominal Aortic US\par -6 month follow up\par -offer flu shot in 3months and prn\par

## 2022-07-22 NOTE — PHYSICAL EXAM
[No Acute Distress] : no acute distress [Well Nourished] : well nourished [Well Developed] : well developed [Normal Sclera/Conjunctiva] : normal sclera/conjunctiva [Normal Outer Ear/Nose] : the outer ears and nose were normal in appearance [No Respiratory Distress] : no respiratory distress  [No Accessory Muscle Use] : no accessory muscle use [Normal Rate] : normal rate  [Regular Rhythm] : with a regular rhythm [Normal S1, S2] : normal S1 and S2 [No Edema] : there was no peripheral edema [Soft] : abdomen soft [Normal Bowel Sounds] : normal bowel sounds [Normal Supraclavicular Nodes] : no supraclavicular lymphadenopathy [Normal Anterior Cervical Nodes] : no anterior cervical lymphadenopathy [No Joint Swelling] : no joint swelling [No Rash] : no rash [Coordination Grossly Intact] : coordination grossly intact [Normal Gait] : normal gait [Normal Affect] : the affect was normal [Normal Mood] : the mood was normal

## 2022-07-22 NOTE — REVIEW OF SYSTEMS
[Fever] : no fever [Chills] : no chills [Fatigue] : no fatigue [Vision Problems] : no vision problems [Hearing Loss] : no hearing loss [Nasal Discharge] : no nasal discharge [Sore Throat] : no sore throat [Chest Pain] : no chest pain [Palpitations] : no palpitations [Shortness Of Breath] : no shortness of breath [Wheezing] : no wheezing [Abdominal Pain] : no abdominal pain [Nausea] : no nausea [Vomiting] : no vomiting [Dysuria] : no dysuria [Incontinence] : no incontinence [Joint Stiffness] : no joint stiffness [Back Pain] : no back pain [Skin Rash] : no skin rash [Headache] : no headache [Dizziness] : no dizziness

## 2022-07-26 DIAGNOSIS — Z00.00 ENCOUNTER FOR GENERAL ADULT MEDICAL EXAMINATION WITHOUT ABNORMAL FINDINGS: ICD-10-CM

## 2022-07-26 DIAGNOSIS — K76.0 FATTY (CHANGE OF) LIVER, NOT ELSEWHERE CLASSIFIED: ICD-10-CM

## 2022-07-26 DIAGNOSIS — E78.5 HYPERLIPIDEMIA, UNSPECIFIED: ICD-10-CM

## 2022-07-26 DIAGNOSIS — E11.9 TYPE 2 DIABETES MELLITUS WITHOUT COMPLICATIONS: ICD-10-CM

## 2022-07-26 DIAGNOSIS — K59.00 CONSTIPATION, UNSPECIFIED: ICD-10-CM

## 2022-08-04 ENCOUNTER — APPOINTMENT (OUTPATIENT)
Dept: GASTROENTEROLOGY | Facility: CLINIC | Age: 67
End: 2022-08-04

## 2022-08-04 ENCOUNTER — NON-APPOINTMENT (OUTPATIENT)
Age: 67
End: 2022-08-04

## 2022-08-04 PROCEDURE — 91200 LIVER ELASTOGRAPHY: CPT

## 2022-08-05 ENCOUNTER — APPOINTMENT (OUTPATIENT)
Dept: PODIATRY | Facility: CLINIC | Age: 67
End: 2022-08-05

## 2022-08-05 ENCOUNTER — APPOINTMENT (OUTPATIENT)
Dept: NUTRITION | Facility: CLINIC | Age: 67
End: 2022-08-05

## 2022-08-05 ENCOUNTER — OUTPATIENT (OUTPATIENT)
Dept: OUTPATIENT SERVICES | Facility: HOSPITAL | Age: 67
LOS: 1 days | Discharge: HOME | End: 2022-08-05

## 2022-08-05 DIAGNOSIS — Z98.890 OTHER SPECIFIED POSTPROCEDURAL STATES: Chronic | ICD-10-CM

## 2022-08-05 DIAGNOSIS — M72.2 PLANTAR FASCIAL FIBROMATOSIS: ICD-10-CM

## 2022-08-05 DIAGNOSIS — M79.671 PAIN IN RIGHT FOOT: ICD-10-CM

## 2022-08-05 DIAGNOSIS — M79.672 PAIN IN LEFT FOOT: ICD-10-CM

## 2022-08-05 PROCEDURE — 99213 OFFICE O/P EST LOW 20 MIN: CPT

## 2022-08-24 PROBLEM — M72.2 PLANTAR FASCIITIS: Status: ACTIVE | Noted: 2017-03-28

## 2022-08-24 NOTE — PHYSICAL EXAM
[Full Pulse] : the pedal pulses are present [Motor Tone] : muscle strength and tone were normal [Skin Color & Pigmentation] : normal skin color and pigmentation [Skin Turgor] : normal skin turgor [] : no rash [Skin Lesions] : no skin lesions [FreeTextEntry1] : dry plantar skin

## 2022-08-24 NOTE — PROCEDURE
[FreeTextEntry1] : Patient examined, history and chart reviewed\par Discussed plan of fasciitis in detail\par Discussed possible use of cam boot injection therapy physical therapy and if conservative treatment fails surgery\par Patient refuses injections at this time\par Patient refuses oral medication\par Discussed topical patient will obtain OTC

## 2022-09-12 ENCOUNTER — APPOINTMENT (OUTPATIENT)
Dept: HEPATOLOGY | Facility: CLINIC | Age: 67
End: 2022-09-12

## 2022-09-12 ENCOUNTER — OUTPATIENT (OUTPATIENT)
Dept: OUTPATIENT SERVICES | Facility: HOSPITAL | Age: 67
LOS: 1 days | Discharge: HOME | End: 2022-09-12

## 2022-09-12 VITALS
HEART RATE: 71 BPM | OXYGEN SATURATION: 98 % | HEIGHT: 60 IN | SYSTOLIC BLOOD PRESSURE: 140 MMHG | DIASTOLIC BLOOD PRESSURE: 70 MMHG

## 2022-09-12 DIAGNOSIS — E11.9 TYPE 2 DIABETES MELLITUS W/OUT COMPLICATIONS: ICD-10-CM

## 2022-09-12 DIAGNOSIS — K76.0 FATTY (CHANGE OF) LIVER, NOT ELSEWHERE CLASSIFIED: ICD-10-CM

## 2022-09-12 DIAGNOSIS — Z78.9 OTHER SPECIFIED HEALTH STATUS: ICD-10-CM

## 2022-09-12 DIAGNOSIS — E66.9 OBESITY, UNSPECIFIED: ICD-10-CM

## 2022-09-12 DIAGNOSIS — E78.5 HYPERLIPIDEMIA, UNSPECIFIED: ICD-10-CM

## 2022-09-12 DIAGNOSIS — E11.9 TYPE 2 DIABETES MELLITUS WITHOUT COMPLICATIONS: ICD-10-CM

## 2022-09-12 DIAGNOSIS — Z98.890 OTHER SPECIFIED POSTPROCEDURAL STATES: Chronic | ICD-10-CM

## 2022-09-12 PROCEDURE — 99215 OFFICE O/P EST HI 40 MIN: CPT | Mod: GC

## 2022-09-12 NOTE — REVIEW OF SYSTEMS
[As Noted in HPI] : as noted in HPI [Fever] : no fever [Chills] : no chills [Eye Pain] : no eye pain [Chest Pain] : no chest pain [Palpitations] : no palpitations [Shortness Of Breath] : no shortness of breath [SOB on Exertion] : no shortness of breath during exertion [Pelvic Pain] : no pelvic pain [Joint Swelling] : no joint swelling [Skin Lesions] : no skin lesions [Confused] : no confusion [Convulsions] : no convulsions [Anxiety] : no anxiety [Muscle Weakness] : no muscle weakness

## 2022-09-12 NOTE — END OF VISIT
[] : Fellow [FreeTextEntry3] : 67 year old  male with obesity T2DM HLD seen for hepatic steatosis \par Obese\par Abdomen soft non tender\par NAFLD has metabolic syndrome and imaging shows steatosis. Has cholestatic liver tests. \par ALP still elevated - mild GGT elevation\par Cholestatic work up negative for hepatitis B hepatitis C. JUNIOR ASMA AMA negative\par Liver elastography showed severe steatosis CAP score 348 LS 8 F2 fibrosis \par Check ACE level and Ig level, hepatitis A Ig G\par Declined hepatitis B vaccination. \par Follow up in 3 months\par   [Time Spent: ___ minutes] : I have spent [unfilled] minutes of time on the encounter.

## 2022-09-12 NOTE — HISTORY OF PRESENT ILLNESS
[FreeTextEntry1] : 65 y/o M w a pmhx of hepatic steatosis, sigmoid diverticulosis, dyslipidemia, DM, Hypothyroidism, benign colonic polyps (seen in 2020 colonoscopy, 3mm at cecum and 5mm Distal colon) and perianal itching 2/2 hemorrhoids following up with hepatology for fatty liver

## 2022-09-12 NOTE — PHYSICAL EXAM
[Alert] : alert [Normal Voice/Communication] : normal voice/communication [Sclera] : the sclera and conjunctiva were normal [Hearing Threshold Finger Rub Not Rabun] : hearing was normal [Normal Appearance] : the appearance of the neck was normal [No Respiratory Distress] : no respiratory distress [Heart Rate And Rhythm] : heart rate was normal and rhythm regular [Bowel Sounds] : normal bowel sounds [Abdomen Tenderness] : non-tender [No Masses] : no abdominal mass palpated [Abdomen Soft] : soft

## 2022-09-12 NOTE — ASSESSMENT
[FreeTextEntry1] : 65 y/o M w a pmhx of hepatic steatosis, sigmoid diverticulosis, dyslipidemia, DM, Hypothyroidism, benign colonic polyps (seen in 2020 colonoscopy, 3mm at cecum and 5mm Distal colon) and perianal itching 2/2 hemorrhoids following up with hepatology for fatty liver \par \par # Non-Alcoholic Hepatic Steatosis - In the setting of Obesity, DM & DLD;\par - Denies abdominal pain or tenderness on exam \par - Labs(4/2022): AST/ALT wnl; \par - RUQ US: Diffusely echogenic liver \par - Last CT Abd in 2016 demonstrating hepatic steatosis; \par - GGT 75\par - JUNIOR negative\par -  Anti-smooth muscle Ab negative \par -  Anti-mitochondrial Ab negative \par - Hepatitis BsAg and sAB negative \par - Fibroscan S3 F2\par - refusing hepatitis B vaccine\par - check hepatitis A titers \par \par # Colonoscopy: Last done on 6/13/19 by Dr. Catherine>> good prep, 3 mm cecal polyp (mucosa), 5 mm DC TA, mild sigmoid diverticulosis, internal hemorrhoids, SC erythema (mild inflammation on bx) \par Repeat colonoscopy in 5 years (2024)\par \par #  T2DM, Hypothyroidism & DLD\par - f/u with PMD \par \par f/u in 6 months \par  [id number]\par \par

## 2022-10-07 ENCOUNTER — APPOINTMENT (OUTPATIENT)
Dept: NUTRITION | Facility: CLINIC | Age: 67
End: 2022-10-07

## 2023-02-17 ENCOUNTER — APPOINTMENT (OUTPATIENT)
Age: 68
End: 2023-02-17

## 2023-02-17 ENCOUNTER — APPOINTMENT (OUTPATIENT)
Dept: INTERNAL MEDICINE | Facility: CLINIC | Age: 68
End: 2023-02-17

## 2023-04-11 ENCOUNTER — APPOINTMENT (OUTPATIENT)
Dept: PLASTIC SURGERY | Facility: CLINIC | Age: 68
End: 2023-04-11
Payer: COMMERCIAL

## 2023-04-11 VITALS — WEIGHT: 170 LBS | BODY MASS INDEX: 33.38 KG/M2 | HEIGHT: 60 IN

## 2023-04-11 DIAGNOSIS — R22.32 LOCALIZED SWELLING, MASS AND LUMP, RIGHT UPPER LIMB: ICD-10-CM

## 2023-04-11 DIAGNOSIS — R22.31 LOCALIZED SWELLING, MASS AND LUMP, RIGHT UPPER LIMB: ICD-10-CM

## 2023-04-11 PROCEDURE — 99203 OFFICE O/P NEW LOW 30 MIN: CPT

## 2023-04-11 NOTE — PHYSICAL EXAM
[de-identified] : well developed  male, NAD [de-identified] : OGs [de-identified] : bilateral neck excision sites all healed, clean, no evidence of infection  [de-identified] : Bilateral middle finger DIPJ with firm, arthritic like, non-mobile masses.  FROM with intact sensory exam. Denies tenderness [de-identified] : Right lateral canthus with 6mm dark brown scab like slightly raised lesion. No erythema/cellulitis or active drainage.  [de-identified] : ZAID

## 2023-04-11 NOTE — ASSESSMENT
[FreeTextEntry1] : bilateral hand X-rays\par non contrast facial CT\par \par recently started wearing glasses and they do not sit straight on nose\par \par Photos taken with patient permission.\par \par f/u p CT and hand xrays for final planning \par

## 2023-04-11 NOTE — HISTORY OF PRESENT ILLNESS
[FreeTextEntry1] : 66 y/o RHD male previously known to practice in 2019 with h/o hepatic steatosis, diverticulosis, HLD, DM (A1c from April 2022 7.1%), hypothyroidism who presents for evaluation of multiple issues. \par \par 1st issue- Right lateral canthal lesion which he first noticed approx 3 months ago, c.o itching and some bleeding from area when he shaves. Denies h/o infection to area. Pt does not remember if any trauma to area. \par Denies h/o skin cancer, does not see a dermatologist \par \par 2nd issue- Also with c/o nasal septum deviation. Never saw ENT, denies breathing issues. His main concern is that his glasses are not able to sit straight on his nose. Pt denies any trauma or injury to the area, however may of happened when he boxed as a teen, unclear. Never had CT performed. \par \par 3rd issue pt presents with is c.o right middle and left middle finger hard, non-mobile DIPJ masses which he first noticed 5-6 years ago. Denies any hand trauma, tingling, paresthesia, numbness, limited ROM or finger locking. Denies any known h/o arthritis. \par \par Non-smoker\par Occ: Construction\par

## 2023-05-01 ENCOUNTER — OUTPATIENT (OUTPATIENT)
Dept: OUTPATIENT SERVICES | Facility: HOSPITAL | Age: 68
LOS: 1 days | End: 2023-05-01
Payer: COMMERCIAL

## 2023-05-01 ENCOUNTER — RESULT REVIEW (OUTPATIENT)
Age: 68
End: 2023-05-01

## 2023-05-01 DIAGNOSIS — Z98.890 OTHER SPECIFIED POSTPROCEDURAL STATES: Chronic | ICD-10-CM

## 2023-05-01 DIAGNOSIS — Z00.8 ENCOUNTER FOR OTHER GENERAL EXAMINATION: ICD-10-CM

## 2023-05-01 DIAGNOSIS — R22.31 LOCALIZED SWELLING, MASS AND LUMP, RIGHT UPPER LIMB: ICD-10-CM

## 2023-05-01 PROCEDURE — 70486 CT MAXILLOFACIAL W/O DYE: CPT | Mod: 26

## 2023-05-01 PROCEDURE — 73130 X-RAY EXAM OF HAND: CPT | Mod: 50

## 2023-05-01 PROCEDURE — 73130 X-RAY EXAM OF HAND: CPT | Mod: 26,50

## 2023-05-01 PROCEDURE — 70486 CT MAXILLOFACIAL W/O DYE: CPT

## 2023-05-02 DIAGNOSIS — R22.31 LOCALIZED SWELLING, MASS AND LUMP, RIGHT UPPER LIMB: ICD-10-CM

## 2023-05-09 ENCOUNTER — APPOINTMENT (OUTPATIENT)
Dept: PLASTIC SURGERY | Facility: CLINIC | Age: 68
End: 2023-05-09
Payer: COMMERCIAL

## 2023-05-09 PROCEDURE — 99212 OFFICE O/P EST SF 10 MIN: CPT

## 2023-05-09 NOTE — ASSESSMENT
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

## 2023-05-09 NOTE — HISTORY OF PRESENT ILLNESS
[FreeTextEntry1] : 68 y/o RHD male previously known to practice in 2019 with h/o hepatic steatosis, diverticulosis, HLD, DM (A1c from April 2022 7.1%), hypothyroidism who presents for evaluation of multiple issues. \par \par 1st issue- Right lateral canthal lesion which he first noticed approx 3 months ago, c.o itching and some bleeding from area when he shaves. Denies h/o infection to area. Pt does not remember if any trauma to area. \par Denies h/o skin cancer, does not see a dermatologist \par \par 2nd issue- Also with c/o nasal septum deviation. Never saw ENT, denies breathing issues. His main concern is that his glasses are not able to sit straight on his nose. Pt denies any trauma or injury to the area, however may of happened when he boxed as a teen, unclear. Never had CT performed. \par \par 3rd issue pt presents with is c.o right middle and left middle finger hard, non-mobile DIPJ masses which he first noticed 5-6 years ago. Denies any hand trauma, tingling, paresthesia, numbness, limited ROM or finger locking. Denies any known h/o arthritis. \par \par Non-smoker\par Occ: Construction\par

## 2023-05-09 NOTE — PHYSICAL EXAM
[de-identified] : well developed  male, NAD [de-identified] : OGs [de-identified] : bilateral neck excision sites all healed, clean, no evidence of infection  [de-identified] : Bilateral middle finger DIPJ with firm, arthritic like, non-mobile masses.  FROM with intact sensory exam. Denies tenderness [de-identified] : Right lateral canthus with 6mm dark brown scab like slightly raised lesion. No erythema/cellulitis or active drainage.  [de-identified] : ZAID

## 2023-06-19 ENCOUNTER — APPOINTMENT (OUTPATIENT)
Dept: PLASTIC SURGERY | Facility: AMBULATORY SURGERY CENTER | Age: 68
End: 2023-06-19

## 2023-08-30 ENCOUNTER — OUTPATIENT (OUTPATIENT)
Dept: OUTPATIENT SERVICES | Facility: HOSPITAL | Age: 68
LOS: 1 days | End: 2023-08-30
Payer: MEDICAID

## 2023-08-30 DIAGNOSIS — M54.9 DORSALGIA, UNSPECIFIED: ICD-10-CM

## 2023-08-30 DIAGNOSIS — Z98.890 OTHER SPECIFIED POSTPROCEDURAL STATES: Chronic | ICD-10-CM

## 2023-08-30 PROCEDURE — 72110 X-RAY EXAM L-2 SPINE 4/>VWS: CPT

## 2023-08-30 PROCEDURE — 72110 X-RAY EXAM L-2 SPINE 4/>VWS: CPT | Mod: 26

## 2023-08-31 DIAGNOSIS — M54.9 DORSALGIA, UNSPECIFIED: ICD-10-CM

## 2024-07-05 ENCOUNTER — NON-APPOINTMENT (OUTPATIENT)
Age: 69
End: 2024-07-05

## 2024-11-23 NOTE — ED ADULT TRIAGE NOTE - ESI TRIAGE ACUITY LEVEL, MLM
Alert-The patient is alert, awake and responds to voice. The patient is oriented to time, place, and person. The triage nurse is able to obtain subjective information. 3 Detail Level: Detailed Detail Level: Zone

## 2025-07-29 ENCOUNTER — APPOINTMENT (OUTPATIENT)
Dept: PLASTIC SURGERY | Facility: CLINIC | Age: 70
End: 2025-07-29
Payer: COMMERCIAL

## 2025-07-29 PROCEDURE — 99212 OFFICE O/P EST SF 10 MIN: CPT

## 2025-07-29 PROCEDURE — 99213 OFFICE O/P EST LOW 20 MIN: CPT

## 2025-07-29 PROCEDURE — G2211 COMPLEX E/M VISIT ADD ON: CPT

## 2025-08-05 ENCOUNTER — OUTPATIENT (OUTPATIENT)
Dept: OUTPATIENT SERVICES | Facility: HOSPITAL | Age: 70
LOS: 1 days | End: 2025-08-05

## 2025-08-05 ENCOUNTER — APPOINTMENT (OUTPATIENT)
Dept: PODIATRY | Facility: CLINIC | Age: 70
End: 2025-08-05

## 2025-08-05 DIAGNOSIS — B35.3 TINEA PEDIS: ICD-10-CM

## 2025-08-05 DIAGNOSIS — Z98.890 OTHER SPECIFIED POSTPROCEDURAL STATES: Chronic | ICD-10-CM

## 2025-08-05 DIAGNOSIS — Z00.00 ENCOUNTER FOR GENERAL ADULT MEDICAL EXAMINATION WITHOUT ABNORMAL FINDINGS: ICD-10-CM

## 2025-08-05 PROCEDURE — 99213 OFFICE O/P EST LOW 20 MIN: CPT

## 2025-08-05 RX ORDER — CLOTRIMAZOLE 1% ANTIFUNGAL CREAM 1 G/100G
1 CREAM TOPICAL 3 TIMES DAILY
Qty: 1 | Refills: 3 | Status: ACTIVE | COMMUNITY
Start: 2025-08-05 | End: 1900-01-01

## 2025-08-05 RX ORDER — UREA 400 MG/G
40 LOTION TOPICAL
Qty: 1 | Refills: 3 | Status: ACTIVE | COMMUNITY
Start: 2025-08-05 | End: 1900-01-01

## 2025-08-18 ENCOUNTER — APPOINTMENT (OUTPATIENT)
Dept: PODIATRY | Facility: CLINIC | Age: 70
End: 2025-08-18

## 2025-08-18 ENCOUNTER — OUTPATIENT (OUTPATIENT)
Dept: OUTPATIENT SERVICES | Facility: HOSPITAL | Age: 70
LOS: 1 days | End: 2025-08-18

## 2025-08-18 VITALS — HEIGHT: 60 IN | DIASTOLIC BLOOD PRESSURE: 70 MMHG | SYSTOLIC BLOOD PRESSURE: 135 MMHG

## 2025-08-18 DIAGNOSIS — Z00.00 ENCOUNTER FOR GENERAL ADULT MEDICAL EXAMINATION WITHOUT ABNORMAL FINDINGS: ICD-10-CM

## 2025-08-18 DIAGNOSIS — Z98.890 OTHER SPECIFIED POSTPROCEDURAL STATES: Chronic | ICD-10-CM

## 2025-08-18 PROCEDURE — 99213 OFFICE O/P EST LOW 20 MIN: CPT

## 2025-08-18 RX ORDER — CICLOPIROX OLAMINE 7.7 MG/G
0.77 CREAM TOPICAL
Qty: 1 | Refills: 5 | Status: ACTIVE | COMMUNITY
Start: 2025-08-18 | End: 1900-01-01

## 2025-08-18 RX ORDER — UREA 400 MG/G
40 LOTION TOPICAL
Qty: 1 | Refills: 3 | Status: ACTIVE | COMMUNITY
Start: 2025-08-18 | End: 1900-01-01